# Patient Record
Sex: FEMALE | Race: WHITE | HISPANIC OR LATINO | Employment: UNEMPLOYED | URBAN - METROPOLITAN AREA
[De-identification: names, ages, dates, MRNs, and addresses within clinical notes are randomized per-mention and may not be internally consistent; named-entity substitution may affect disease eponyms.]

---

## 2018-04-20 LAB
ABSOL LYMPHOCYTES (HISTORICAL): 2.3 K/UL (ref 0.5–4)
ALBUMIN SERPL BCP-MCNC: 4.1 G/DL (ref 3–5.2)
ALP SERPL-CCNC: 81 U/L (ref 43–122)
ALT SERPL W P-5'-P-CCNC: 48 U/L (ref 9–52)
ANION GAP SERPL CALCULATED.3IONS-SCNC: 10 MMOL/L (ref 5–14)
APTT PPP: 26 SEC (ref 23–31)
AST SERPL W P-5'-P-CCNC: 26 U/L (ref 14–36)
BASOPHILS # BLD AUTO: 0.1 K/UL (ref 0–0.1)
BASOPHILS # BLD AUTO: 1 % (ref 0–1)
BILIRUB SERPL-MCNC: 0.3 MG/DL
BUN SERPL-MCNC: 21 MG/DL (ref 5–25)
CALCIUM SERPL-MCNC: 9.6 MG/DL (ref 8.4–10.2)
CHLORIDE SERPL-SCNC: 101 MEQ/L (ref 97–108)
CO2 SERPL-SCNC: 28 MMOL/L (ref 22–30)
CREATINE, SERUM (HISTORICAL): 0.61 MG/DL (ref 0.6–1.2)
D-DIMER QUANTITATIVE (HISTORICAL): 0.36 MG/L FEU'S
DEPRECATED RDW RBC AUTO: 14.1 %
EGFR (HISTORICAL): >60 ML/MIN/1.73 M2
EOSINOPHIL # BLD AUTO: 0.3 K/UL (ref 0–0.4)
EOSINOPHIL NFR BLD AUTO: 5 % (ref 0–6)
GLUCOSE SERPL-MCNC: 67 MG/DL (ref 70–99)
GLUCOSE SERPL-MCNC: 90 MG/DL (ref 70–99)
HCT VFR BLD AUTO: 43.4 % (ref 36–46)
HGB BLD-MCNC: 14 G/DL (ref 12–16)
LYMPHOCYTES NFR BLD AUTO: 36 % (ref 25–45)
MCH RBC QN AUTO: 29 PG (ref 26–34)
MCHC RBC AUTO-ENTMCNC: 32.2 % (ref 31–36)
MCV RBC AUTO: 90 FL (ref 80–100)
MONOCYTES # BLD AUTO: 0.5 K/UL (ref 0.2–0.9)
MONOCYTES NFR BLD AUTO: 7 % (ref 1–10)
NEUTROPHILS ABS COUNT (HISTORICAL): 3.4 K/UL (ref 1.8–7.8)
NEUTS SEG NFR BLD AUTO: 51 % (ref 45–65)
PLATELET # BLD AUTO: 164 K/MCL (ref 150–450)
POTASSIUM SERPL-SCNC: 3.5 MEQ/L (ref 3.6–5)
PT - I.N. RATIO (HISTORICAL): 1 RATIO(INR)
PT, PATIENT (HISTORICAL): 10 SEC (ref 9.2–11.1)
RBC # BLD AUTO: 4.83 M/MCL (ref 4–5.2)
SODIUM SERPL-SCNC: 139 MEQ/L (ref 137–147)
TOTAL PROTEIN (HISTORICAL): 6.8 G/DL (ref 5.9–8.4)
TROPONIN I SERPL-MCNC: <0.01 NG/ML (ref 0–0.03)
TSH SERPL DL<=0.05 MIU/L-ACNC: 2.7 UIU/ML (ref 0.47–4.68)
WBC # BLD AUTO: 6.6 K/MCL (ref 4.5–11)

## 2019-05-23 ENCOUNTER — OFFICE VISIT (OUTPATIENT)
Dept: FAMILY MEDICINE CLINIC | Facility: CLINIC | Age: 49
End: 2019-05-23

## 2019-05-23 VITALS
RESPIRATION RATE: 16 BRPM | HEIGHT: 57 IN | BODY MASS INDEX: 23.95 KG/M2 | SYSTOLIC BLOOD PRESSURE: 112 MMHG | DIASTOLIC BLOOD PRESSURE: 70 MMHG | OXYGEN SATURATION: 97 % | TEMPERATURE: 97.9 F | HEART RATE: 67 BPM | WEIGHT: 111 LBS

## 2019-05-23 DIAGNOSIS — R07.9 CHEST PAIN, UNSPECIFIED TYPE: ICD-10-CM

## 2019-05-23 DIAGNOSIS — H91.90 HEARING LOSS, UNSPECIFIED HEARING LOSS TYPE, UNSPECIFIED LATERALITY: ICD-10-CM

## 2019-05-23 DIAGNOSIS — Z13.220 SCREENING CHOLESTEROL LEVEL: ICD-10-CM

## 2019-05-23 DIAGNOSIS — R41.89 COGNITIVE DECLINE: Primary | ICD-10-CM

## 2019-05-23 DIAGNOSIS — R53.83 FATIGUE, UNSPECIFIED TYPE: ICD-10-CM

## 2019-05-23 DIAGNOSIS — M54.2 NECK PAIN: ICD-10-CM

## 2019-05-23 DIAGNOSIS — G89.29 CHRONIC LEFT-SIDED LOW BACK PAIN WITH LEFT-SIDED SCIATICA: ICD-10-CM

## 2019-05-23 DIAGNOSIS — M54.42 CHRONIC LEFT-SIDED LOW BACK PAIN WITH LEFT-SIDED SCIATICA: ICD-10-CM

## 2019-05-23 DIAGNOSIS — D50.9 IRON DEFICIENCY ANEMIA, UNSPECIFIED IRON DEFICIENCY ANEMIA TYPE: ICD-10-CM

## 2019-05-23 PROCEDURE — 99051 MED SERV EVE/WKEND/HOLIDAY: CPT | Performed by: PHYSICIAN ASSISTANT

## 2019-05-23 PROCEDURE — 99204 OFFICE O/P NEW MOD 45 MIN: CPT | Performed by: PHYSICIAN ASSISTANT

## 2019-05-23 RX ORDER — ACETAMINOPHEN 500 MG
TABLET ORAL
COMMUNITY
Start: 2016-12-21

## 2019-05-23 RX ORDER — CALCIUM/D3/MAG OX/COP/MANG/ZN 300 MG-20
1 TABLET ORAL 2 TIMES DAILY
Refills: 1 | COMMUNITY
Start: 2019-05-09 | End: 2020-09-23 | Stop reason: SDUPTHER

## 2019-05-23 RX ORDER — METRONIDAZOLE 500 MG/1
TABLET ORAL
Refills: 0 | COMMUNITY
Start: 2019-05-10 | End: 2020-08-27 | Stop reason: ALTCHOICE

## 2019-05-23 RX ORDER — CONJUGATED ESTROGENS 0.3 MG/1
TABLET, FILM COATED ORAL
Refills: 0 | COMMUNITY
Start: 2019-05-10 | End: 2021-09-20

## 2019-05-24 PROBLEM — H91.90 HEARING LOSS: Status: ACTIVE | Noted: 2019-05-24

## 2019-06-15 ENCOUNTER — APPOINTMENT (OUTPATIENT)
Dept: LAB | Age: 49
End: 2019-06-15
Payer: COMMERCIAL

## 2019-06-15 ENCOUNTER — APPOINTMENT (OUTPATIENT)
Dept: RADIOLOGY | Age: 49
End: 2019-06-15
Payer: COMMERCIAL

## 2019-06-15 DIAGNOSIS — Z13.220 SCREENING CHOLESTEROL LEVEL: ICD-10-CM

## 2019-06-15 DIAGNOSIS — G89.29 CHRONIC LEFT-SIDED LOW BACK PAIN WITH LEFT-SIDED SCIATICA: ICD-10-CM

## 2019-06-15 DIAGNOSIS — R07.9 CHEST PAIN, UNSPECIFIED TYPE: ICD-10-CM

## 2019-06-15 DIAGNOSIS — M54.42 CHRONIC LEFT-SIDED LOW BACK PAIN WITH LEFT-SIDED SCIATICA: ICD-10-CM

## 2019-06-15 DIAGNOSIS — M54.2 NECK PAIN: ICD-10-CM

## 2019-06-15 DIAGNOSIS — D50.9 IRON DEFICIENCY ANEMIA, UNSPECIFIED IRON DEFICIENCY ANEMIA TYPE: ICD-10-CM

## 2019-06-15 DIAGNOSIS — R53.83 FATIGUE, UNSPECIFIED TYPE: ICD-10-CM

## 2019-06-15 DIAGNOSIS — R41.89 COGNITIVE DECLINE: ICD-10-CM

## 2019-06-15 LAB
ALBUMIN SERPL BCP-MCNC: 3.9 G/DL (ref 3.5–5)
ALP SERPL-CCNC: 95 U/L (ref 46–116)
ALT SERPL W P-5'-P-CCNC: 47 U/L (ref 12–78)
ANION GAP SERPL CALCULATED.3IONS-SCNC: 1 MMOL/L (ref 4–13)
AST SERPL W P-5'-P-CCNC: 25 U/L (ref 5–45)
BASOPHILS # BLD AUTO: 0.04 THOUSANDS/ΜL (ref 0–0.1)
BASOPHILS NFR BLD AUTO: 1 % (ref 0–1)
BILIRUB SERPL-MCNC: 0.3 MG/DL (ref 0.2–1)
BILIRUB UR QL STRIP: NEGATIVE
BUN SERPL-MCNC: 14 MG/DL (ref 5–25)
CALCIUM SERPL-MCNC: 9.2 MG/DL (ref 8.3–10.1)
CHLORIDE SERPL-SCNC: 106 MMOL/L (ref 100–108)
CHOLEST SERPL-MCNC: 193 MG/DL (ref 50–200)
CLARITY UR: CLEAR
CO2 SERPL-SCNC: 30 MMOL/L (ref 21–32)
COLOR UR: YELLOW
CREAT SERPL-MCNC: 0.6 MG/DL (ref 0.6–1.3)
CRP SERPL QL: <3 MG/L
EOSINOPHIL # BLD AUTO: 0.27 THOUSAND/ΜL (ref 0–0.61)
EOSINOPHIL NFR BLD AUTO: 6 % (ref 0–6)
ERYTHROCYTE [DISTWIDTH] IN BLOOD BY AUTOMATED COUNT: 14.5 % (ref 11.6–15.1)
FERRITIN SERPL-MCNC: 73 NG/ML (ref 8–388)
FOLATE SERPL-MCNC: 19.4 NG/ML (ref 3.1–17.5)
GFR SERPL CREATININE-BSD FRML MDRD: 107 ML/MIN/1.73SQ M
GLUCOSE P FAST SERPL-MCNC: 69 MG/DL (ref 65–99)
GLUCOSE UR STRIP-MCNC: NEGATIVE MG/DL
HCT VFR BLD AUTO: 46.4 % (ref 34.8–46.1)
HDLC SERPL-MCNC: 80 MG/DL (ref 40–60)
HGB BLD-MCNC: 14.7 G/DL (ref 11.5–15.4)
HGB UR QL STRIP.AUTO: NEGATIVE
IMM GRANULOCYTES # BLD AUTO: 0.01 THOUSAND/UL (ref 0–0.2)
IMM GRANULOCYTES NFR BLD AUTO: 0 % (ref 0–2)
IRON SERPL-MCNC: 62 UG/DL (ref 50–170)
KETONES UR STRIP-MCNC: NEGATIVE MG/DL
LDLC SERPL CALC-MCNC: 105 MG/DL (ref 0–100)
LEUKOCYTE ESTERASE UR QL STRIP: NEGATIVE
LYMPHOCYTES # BLD AUTO: 1.72 THOUSANDS/ΜL (ref 0.6–4.47)
LYMPHOCYTES NFR BLD AUTO: 37 % (ref 14–44)
MCH RBC QN AUTO: 29.6 PG (ref 26.8–34.3)
MCHC RBC AUTO-ENTMCNC: 31.7 G/DL (ref 31.4–37.4)
MCV RBC AUTO: 94 FL (ref 82–98)
MONOCYTES # BLD AUTO: 0.34 THOUSAND/ΜL (ref 0.17–1.22)
MONOCYTES NFR BLD AUTO: 7 % (ref 4–12)
NEUTROPHILS # BLD AUTO: 2.23 THOUSANDS/ΜL (ref 1.85–7.62)
NEUTS SEG NFR BLD AUTO: 49 % (ref 43–75)
NITRITE UR QL STRIP: NEGATIVE
NONHDLC SERPL-MCNC: 113 MG/DL
NRBC BLD AUTO-RTO: 0 /100 WBCS
PH UR STRIP.AUTO: 6 [PH]
PLATELET # BLD AUTO: 176 THOUSANDS/UL (ref 149–390)
PMV BLD AUTO: 13.4 FL (ref 8.9–12.7)
POTASSIUM SERPL-SCNC: 4 MMOL/L (ref 3.5–5.3)
PROT SERPL-MCNC: 8.2 G/DL (ref 6.4–8.2)
PROT UR STRIP-MCNC: NEGATIVE MG/DL
RBC # BLD AUTO: 4.96 MILLION/UL (ref 3.81–5.12)
SODIUM SERPL-SCNC: 137 MMOL/L (ref 136–145)
SP GR UR STRIP.AUTO: 1.01 (ref 1–1.03)
TIBC SERPL-MCNC: 452 UG/DL (ref 250–450)
TRIGL SERPL-MCNC: 38 MG/DL
TSH SERPL DL<=0.05 MIU/L-ACNC: 1.97 UIU/ML (ref 0.36–3.74)
UROBILINOGEN UR QL STRIP.AUTO: 0.2 E.U./DL
VIT B12 SERPL-MCNC: 607 PG/ML (ref 100–900)
WBC # BLD AUTO: 4.61 THOUSAND/UL (ref 4.31–10.16)

## 2019-06-15 PROCEDURE — 86140 C-REACTIVE PROTEIN: CPT

## 2019-06-15 PROCEDURE — 72040 X-RAY EXAM NECK SPINE 2-3 VW: CPT

## 2019-06-15 PROCEDURE — 82607 VITAMIN B-12: CPT

## 2019-06-15 PROCEDURE — 80061 LIPID PANEL: CPT

## 2019-06-15 PROCEDURE — 81003 URINALYSIS AUTO W/O SCOPE: CPT | Performed by: PHYSICIAN ASSISTANT

## 2019-06-15 PROCEDURE — 80053 COMPREHEN METABOLIC PANEL: CPT

## 2019-06-15 PROCEDURE — 82746 ASSAY OF FOLIC ACID SERUM: CPT

## 2019-06-15 PROCEDURE — 85025 COMPLETE CBC W/AUTO DIFF WBC: CPT

## 2019-06-15 PROCEDURE — 71046 X-RAY EXAM CHEST 2 VIEWS: CPT

## 2019-06-15 PROCEDURE — 72110 X-RAY EXAM L-2 SPINE 4/>VWS: CPT

## 2019-06-15 PROCEDURE — 84425 ASSAY OF VITAMIN B-1: CPT

## 2019-06-15 PROCEDURE — 83540 ASSAY OF IRON: CPT

## 2019-06-15 PROCEDURE — 83550 IRON BINDING TEST: CPT

## 2019-06-15 PROCEDURE — 82728 ASSAY OF FERRITIN: CPT

## 2019-06-15 PROCEDURE — 86618 LYME DISEASE ANTIBODY: CPT

## 2019-06-15 PROCEDURE — 36415 COLL VENOUS BLD VENIPUNCTURE: CPT

## 2019-06-15 PROCEDURE — 84443 ASSAY THYROID STIM HORMONE: CPT

## 2019-06-15 PROCEDURE — 86592 SYPHILIS TEST NON-TREP QUAL: CPT

## 2019-06-16 LAB
B BURGDOR IGG SER IA-ACNC: 0.16
B BURGDOR IGM SER IA-ACNC: 0.32
RPR SER QL: NORMAL

## 2019-06-20 LAB — VIT B1 BLD-SCNC: 118 NMOL/L (ref 66.5–200)

## 2019-06-26 ENCOUNTER — TELEPHONE (OUTPATIENT)
Dept: FAMILY MEDICINE CLINIC | Facility: CLINIC | Age: 49
End: 2019-06-26

## 2019-06-26 DIAGNOSIS — M54.50 CHRONIC BILATERAL LOW BACK PAIN WITHOUT SCIATICA: ICD-10-CM

## 2019-06-26 DIAGNOSIS — G89.29 CHRONIC BILATERAL LOW BACK PAIN WITHOUT SCIATICA: ICD-10-CM

## 2019-06-26 DIAGNOSIS — M54.2 NECK PAIN: Primary | ICD-10-CM

## 2019-07-18 ENCOUNTER — TELEPHONE (OUTPATIENT)
Dept: FAMILY MEDICINE CLINIC | Facility: CLINIC | Age: 49
End: 2019-07-18

## 2019-07-18 NOTE — TELEPHONE ENCOUNTER
LM on spouses phone as main number is out of service, detailing appt 8/5/19 @ [facility], [address]   Phone: [phone]     Letter and orders mailed to address on file with appt details

## 2019-08-22 ENCOUNTER — APPOINTMENT (EMERGENCY)
Dept: RADIOLOGY | Facility: HOSPITAL | Age: 49
End: 2019-08-22
Payer: COMMERCIAL

## 2019-08-22 ENCOUNTER — HOSPITAL ENCOUNTER (EMERGENCY)
Facility: HOSPITAL | Age: 49
Discharge: HOME | End: 2019-08-23
Attending: EMERGENCY MEDICINE | Admitting: EMERGENCY MEDICINE
Payer: COMMERCIAL

## 2019-08-22 VITALS
BODY MASS INDEX: 23.52 KG/M2 | RESPIRATION RATE: 18 BRPM | TEMPERATURE: 95.5 F | DIASTOLIC BLOOD PRESSURE: 75 MMHG | SYSTOLIC BLOOD PRESSURE: 119 MMHG | HEART RATE: 80 BPM | WEIGHT: 108.69 LBS | OXYGEN SATURATION: 98 %

## 2019-08-22 DIAGNOSIS — J20.9 BRONCHITIS, ACUTE: Primary | ICD-10-CM

## 2019-08-22 PROCEDURE — 99283 EMERGENCY DEPT VISIT LOW MDM: CPT

## 2019-08-22 PROCEDURE — 99284 EMERGENCY DEPT VISIT MOD MDM: CPT | Performed by: EMERGENCY MEDICINE

## 2019-08-22 PROCEDURE — 71046 X-RAY EXAM CHEST 2 VIEWS: CPT

## 2019-08-22 RX ORDER — BENZONATATE 100 MG/1
100 CAPSULE ORAL ONCE
Status: COMPLETED | OUTPATIENT
Start: 2019-08-23 | End: 2019-08-22

## 2019-08-22 RX ADMIN — BENZONATATE 100 MG: 100 CAPSULE ORAL at 23:57

## 2019-08-22 RX ADMIN — DEXAMETHASONE SODIUM PHOSPHATE 10 MG: 10 INJECTION, SOLUTION INTRAMUSCULAR; INTRAVENOUS at 23:57

## 2019-08-23 RX ORDER — ALBUTEROL SULFATE 90 UG/1
2 AEROSOL, METERED RESPIRATORY (INHALATION) EVERY 4 HOURS PRN
Qty: 1 INHALER | Refills: 0 | Status: SHIPPED | OUTPATIENT
Start: 2019-08-23 | End: 2019-10-18 | Stop reason: SDUPTHER

## 2019-08-23 RX ORDER — DEXAMETHASONE 4 MG/1
12 TABLET ORAL ONCE
Qty: 3 TABLET | Refills: 0 | Status: SHIPPED | OUTPATIENT
Start: 2019-08-26 | End: 2019-08-26

## 2019-08-23 RX ORDER — BENZONATATE 100 MG/1
100 CAPSULE ORAL EVERY 8 HOURS
Qty: 21 CAPSULE | Refills: 0 | Status: SHIPPED | OUTPATIENT
Start: 2019-08-23 | End: 2019-09-24 | Stop reason: SDUPTHER

## 2019-08-23 NOTE — ED PROVIDER NOTES
History  Chief Complaint   Patient presents with    Cough     2 weeks, patient presents to ED with congested cough, generalized body aches; Diarrhea 3 days PTA, chest pain when coughing     Patient is a 25-year-old female, denies any past medical history, presented to the emergency room for concerns of 2 weeks of cough  Productive of occasional clue to clear sputum  States over the last few days she thinks she has developed subjective fevers  Now has diffuse body aches and nasal congestion  Denies any sick contacts or recent travel outside [facility]  Nothing makes her feel significant better or worse  Prior to Admission Medications   Prescriptions Last Dose Informant Patient Reported? Taking? Calcium Carbonate-Vit D-Min (CALTRATE 600+D PLUS MINIS) 300-800 MG-UNIT TABS 8/21/2019 at Unknown time  Yes Yes   Sig: Take 1 tablet by mouth 2 (two) times a day   PREMARIN 0 3 MG tablet Not Taking at Unknown time  Yes No   acetaminophen (TYLENOL) 500 mg tablet 8/22/2019 at Unknown time  Yes Yes   Sig: take 1 tablet by mouth every 6 hours if needed for pain   conjugated estrogens (PREMARIN) 0 3 mg tablet Not Taking at Unknown time  Yes No   Sig: Take 0 3 mg by mouth daily   metroNIDAZOLE (FLAGYL) 500 mg tablet Not Taking at Unknown time  Yes No   Sig: take 1 tablet by mouth twice a day for 7 days      Facility-Administered Medications: None       Past Medical History:   Diagnosis Date    Mass of breast, left     Mass of breast, right        Past Surgical History:   Procedure Laterality Date    LAPAROSCOPIC CHOLECYSTECTOMY      TUBAL LIGATION         Family History   Problem Relation Age of Onset    Hypertension Mother     Heart disease Father     Breast cancer Maternal Aunt     Pancreatic cancer Maternal Uncle     Stomach cancer Paternal Uncle      I have reviewed and agree with the history as documented      Social History     Tobacco Use    Smoking status: Never Smoker    Smokeless tobacco: Never Used   Substance Use Topics    Alcohol use: Yes     Frequency: Monthly or less    Drug use: Never        Review of Systems   Constitutional: Positive for fever  Negative for chills  HENT: Negative  Negative for rhinorrhea, sore throat, trouble swallowing and voice change  Eyes: Negative  Negative for pain and visual disturbance  Respiratory: Positive for cough  Negative for shortness of breath and wheezing  Cardiovascular: Negative  Negative for chest pain and palpitations  Gastrointestinal: Negative for abdominal pain, diarrhea, nausea and vomiting  Genitourinary: Negative  Negative for dysuria and frequency  Musculoskeletal: Negative  Negative for neck pain and neck stiffness  Skin: Negative  Negative for rash  Neurological: Negative  Negative for dizziness, speech difficulty, weakness, light-headedness and numbness  Physical Exam  Physical Exam   Constitutional: She is oriented to person, place, and time  She appears well-developed and well-nourished  No distress  HENT:   Head: Normocephalic and atraumatic  Mouth/Throat: Oropharynx is clear and moist    Eyes: Pupils are equal, round, and reactive to light  Conjunctivae and EOM are normal    Neck: Normal range of motion  Neck supple  No tracheal deviation present  Cardiovascular: Normal rate, regular rhythm and intact distal pulses  Pulmonary/Chest: Effort normal and breath sounds normal  No respiratory distress  She has no wheezes  She has no rales  Abdominal: Soft  Bowel sounds are normal  She exhibits no distension  There is no tenderness  There is no rebound and no guarding  Musculoskeletal: Normal range of motion  She exhibits no tenderness or deformity  Neurological: She is alert and oriented to person, place, and time  Skin: Skin is warm and dry  Capillary refill takes less than 2 seconds  No rash noted  Psychiatric: She has a normal mood and affect   Her behavior is normal    Nursing note and vitals reviewed  Vital Signs  ED Triage Vitals [08/22/19 2335]   Temperature Pulse Respirations Blood Pressure SpO2   (!) 95 5 °F (35 3 °C) 80 18 119/75 98 %      Temp Source Heart Rate Source Patient Position - Orthostatic VS BP Location FiO2 (%)   Tympanic Monitor Lying Left arm --      Pain Score       --           Vitals:    08/22/19 2335   BP: 119/75   Pulse: 80   Patient Position - Orthostatic VS: Lying         Visual Acuity      ED Medications  Medications   benzonatate (TESSALON PERLES) capsule 100 mg (100 mg Oral Given 8/22/19 2357)   dexamethasone 10 mg/mL oral liquid 10 mg 1 mL (10 mg Oral Given 8/22/19 2357)       Diagnostic Studies  Results Reviewed     None                 XR chest 2 views   ED Interpretation by Nicole Perez DO (08/23 0002)   No acute pna appreciated                 Procedures  Procedures       ED Course                               MDM  Number of Diagnoses or Management Options  Bronchitis, acute:   Diagnosis management comments: 48-year-old female presenting for cough and subjective fevers and chills  He febrile in the emergency department  No tachypnea, hypotension other concerning signs for overwhelming infection present  X-ray without acute pathology per my interpretation  Patient felt slightly better after Tessalon Perles  Given dose of steroids here in the emergency room  Exam consistent with acute bronchitis  Will give repeat steroids, Tessalon Perles and inhaler prescription  Advised to follow up with their primary care physician in the next few days to ensure symptoms are improving, strict return precautions were discussed         Amount and/or Complexity of Data Reviewed  Tests in the radiology section of CPT®: reviewed and ordered        Disposition  Final diagnoses:   Bronchitis, acute     Time reflects when diagnosis was documented in both MDM as applicable and the Disposition within this note     Time User Action Codes Description Comment    8/23/2019 12:03 PARKER Spaulding Add [J20 9] Bronchitis, acute       ED Disposition     ED Disposition Condition Date/Time Comment    Discharge Stable Fri Aug 23, 2019 12:02 AM Lázaro Sawyer discharge to home/self care  Follow-up Information    None         Patient's Medications   Discharge Prescriptions    ALBUTEROL (PROVENTIL HFA,VENTOLIN HFA) 90 MCG/ACT INHALER    Inhale 2 puffs every 4 (four) hours as needed for wheezing       Start Date: 8/23/2019 End Date: --       Order Dose: 2 puffs       Quantity: 1 Inhaler    Refills: 0    BENZONATATE (TESSALON PERLES) 100 MG CAPSULE    Take 1 capsule (100 mg total) by mouth every 8 (eight) hours       Start Date: 8/23/2019 End Date: --       Order Dose: 100 mg       Quantity: 21 capsule    Refills: 0    DEXAMETHASONE (DECADRON) 4 MG TABLET    Take 3 tablets (12 mg total) by mouth once for 1 dose       Start Date: 8/26/2019 End Date: 8/26/2019       Order Dose: 12 mg       Quantity: 3 tablet    Refills: 0     No discharge procedures on file      ED Provider  Electronically Signed by           Bradley Guzmán DO  08/23/19 4736

## 2019-08-26 ENCOUNTER — OFFICE VISIT (OUTPATIENT)
Dept: FAMILY MEDICINE CLINIC | Facility: CLINIC | Age: 49
End: 2019-08-26
Payer: COMMERCIAL

## 2019-08-26 VITALS
BODY MASS INDEX: 23.82 KG/M2 | DIASTOLIC BLOOD PRESSURE: 68 MMHG | HEART RATE: 86 BPM | WEIGHT: 110.4 LBS | SYSTOLIC BLOOD PRESSURE: 100 MMHG | HEIGHT: 57 IN | OXYGEN SATURATION: 98 % | TEMPERATURE: 97.2 F

## 2019-08-26 DIAGNOSIS — Z12.31 SCREENING MAMMOGRAM, ENCOUNTER FOR: ICD-10-CM

## 2019-08-26 DIAGNOSIS — F41.9 ANXIETY: ICD-10-CM

## 2019-08-26 DIAGNOSIS — R52 GENERALIZED BODY ACHES: ICD-10-CM

## 2019-08-26 DIAGNOSIS — F32.A DEPRESSION, UNSPECIFIED DEPRESSION TYPE: ICD-10-CM

## 2019-08-26 DIAGNOSIS — J06.9 UPPER RESPIRATORY TRACT INFECTION, UNSPECIFIED TYPE: Primary | ICD-10-CM

## 2019-08-26 DIAGNOSIS — J40 BRONCHITIS: ICD-10-CM

## 2019-08-26 PROCEDURE — 3008F BODY MASS INDEX DOCD: CPT | Performed by: NURSE PRACTITIONER

## 2019-08-26 PROCEDURE — 99214 OFFICE O/P EST MOD 30 MIN: CPT | Performed by: NURSE PRACTITIONER

## 2019-08-26 PROCEDURE — 1036F TOBACCO NON-USER: CPT | Performed by: NURSE PRACTITIONER

## 2019-08-26 RX ORDER — IBUPROFEN 600 MG/1
600 TABLET ORAL EVERY 6 HOURS PRN
Qty: 30 TABLET | Refills: 0 | Status: SHIPPED | OUTPATIENT
Start: 2019-08-26 | End: 2021-05-25 | Stop reason: ALTCHOICE

## 2019-08-26 RX ORDER — DEXTROMETHORPHAN HYDROBROMIDE AND PROMETHAZINE HYDROCHLORIDE 15; 6.25 MG/5ML; MG/5ML
5 SOLUTION ORAL 4 TIMES DAILY PRN
Qty: 118 ML | Refills: 0 | Status: SHIPPED | OUTPATIENT
Start: 2019-08-26 | End: 2020-04-07 | Stop reason: SDUPTHER

## 2019-08-26 RX ORDER — AZITHROMYCIN 250 MG/1
TABLET, FILM COATED ORAL
Qty: 6 TABLET | Refills: 0 | Status: SHIPPED | OUTPATIENT
Start: 2019-08-26 | End: 2019-08-30

## 2019-08-26 NOTE — LETTER
August 26, 2019     Patient: Brandan Brown   YOB: 1970   Date of Visit: 8/26/2019       To Whom it May Concern:    Brandan Brown is under my professional care  She was seen in my office on 8/26/2019  She may return to work on 8/28/2019  If you have any questions or concerns, please don't hesitate to call           Sincerely,          ROJELIO Moreno        CC: No Recipients

## 2019-08-26 NOTE — PROGRESS NOTES
Assessment/Plan:    Generalized body aches  I recommended warm moist air, hydration, warm facial packs, turning on hot shower and inhaling steam to help ease with breathing  Avoid extremely cool and dry air  Nasal saline may provide temporary relief of congestion by removing nasal crusts and dried secretions  Drinking 8 or more 8-oz glasses of water, juice, or non-caffeinated beverages daily may help to thin mucous secretions and replace fluid losses  Sipping hot water or warm drinks may be more soothing to the nasal passages than ice cold drinks  Should follow up if their symptoms do not improve, worsen within 72 hours, or recur  Upper respiratory tract infection  Starting on cough medication  Recommended plenty of rest and fluids  Strict handwashing and use of hand  to avoid getting anyone else sick  If s/s do not improve in 5 days to return to clinic  Recommending OTC cough drops as well and hot tea to soothe throat  To continue use of inhaler prn  To follow up if in 3 days s/s have not resolved  Diagnoses and all orders for this visit:    Upper respiratory tract infection, unspecified type    Bronchitis  -     Promethazine-DM (PHENERGAN-DM) 6 25-15 mg/5 mL oral syrup; Take 5 mL by mouth 4 (four) times a day as needed for cough  -     azithromycin (ZITHROMAX) 250 mg tablet; Take 2 tablets today then 1 tablet daily x 4 days    Generalized body aches  -     ibuprofen (MOTRIN) 600 mg tablet; Take 1 tablet (600 mg total) by mouth every 6 (six) hours as needed for mild pain or moderate pain  -     Vitamin D 25 hydroxy; Future    Screening mammogram, encounter for  -     Cancel: Mammo screening bilateral w cad; Future    Anxiety  -     Ambulatory referral to Psychiatry; Future    Depression, unspecified depression type  -     Ambulatory referral to Psychiatry; Future          Subjective:      Patient ID: Vernon Candelaria is a 51 y o  female  51year old female patient here to establish care   States she does not have a PCP and last time she was seen at clinic was 2 months ago  Had a physical exam and labs  PMHx: no significant medical history per patient  Cough   This is a new problem  The current episode started 1 to 4 weeks ago (2 weeks)  The problem has been unchanged  The problem occurs constantly  The cough is non-productive  Associated symptoms include a fever, headaches, nasal congestion, a sore throat and wheezing  Pertinent negatives include no chills, ear congestion, ear pain, heartburn, rash, rhinorrhea or shortness of breath  The symptoms are aggravated by lying down  She has tried prescription cough suppressant and steroid inhaler for the symptoms  The treatment provided mild relief  There is no history of asthma, bronchitis, COPD, emphysema, environmental allergies or pneumonia  Generalized Body Aches   The current episode started in the past 7 days  The problem occurs constantly  The problem has been gradually worsening since onset  The pain is moderate  The symptoms are aggravated by activity and movement  Associated symptoms include congestion, headaches, a sore throat, a fever, coughing, wheezing and muscle aches  Pertinent negatives include no ear pain, rhinorrhea, swollen glands, URI, shortness of breath, abdominal pain, constipation, diarrhea, nausea, vomiting or rash  Past treatments include one or more prescription drugs  The treatment provided mild relief  The fever has been present for 1 to 2 days  Her temperature was unmeasured prior to arrival  The cough's precipitants include activity  The cough is non-productive  The cough is relieved by one or more prescription drugs  The cough is worsened by activity and a supine position  There is chest congestion  The congestion interferes with sleep  The congestion does not interfere with eating or drinking  She has been experiencing a mild sore throat  Neither side is more painful than the other   The sore throat is characterized by pain only  There were no sick contacts  The following portions of the patient's history were reviewed and updated as appropriate: allergies, current medications, past family history, past medical history, past social history, past surgical history and problem list     Review of Systems   Constitutional: Positive for fever  Negative for appetite change and chills  HENT: Positive for congestion and sore throat  Negative for ear pain and rhinorrhea  Eyes: Negative  Respiratory: Positive for cough and wheezing  Negative for shortness of breath  Cardiovascular: Negative  Gastrointestinal: Negative  Negative for abdominal pain, constipation, diarrhea, heartburn, nausea and vomiting  Endocrine: Negative  Genitourinary: Negative  Musculoskeletal: Negative  Negative for arthralgias and gait problem  Skin: Negative  Negative for color change and rash  Allergic/Immunologic: Negative  Negative for environmental allergies  Neurological: Positive for headaches  Hematological: Negative  Psychiatric/Behavioral: Negative  Objective:      /68 (BP Location: Left arm, Patient Position: Sitting, Cuff Size: Adult)   Pulse 86   Temp (!) 97 2 °F (36 2 °C) (Temporal)   Ht 4' 9" (1 448 m)   Wt 50 1 kg (110 lb 6 4 oz)   SpO2 98%   BMI 23 89 kg/m²          Physical Exam   Constitutional: She is oriented to person, place, and time  She appears well-developed and well-nourished  No distress  HENT:   Head: Normocephalic and atraumatic  Right Ear: Hearing and external ear normal    Nose: Mucosal edema and rhinorrhea present  Right sinus exhibits no maxillary sinus tenderness and no frontal sinus tenderness  Left sinus exhibits no maxillary sinus tenderness and no frontal sinus tenderness  Mouth/Throat: Uvula is midline, oropharynx is clear and moist and mucous membranes are normal    Eyes: Pupils are equal, round, and reactive to light   Conjunctivae and EOM are normal    Neck: Normal range of motion  Neck supple  Cardiovascular: Normal rate, regular rhythm, normal heart sounds and intact distal pulses  Pulmonary/Chest: Effort normal and breath sounds normal  No respiratory distress  She has no wheezes  She has no rales  Abdominal: Soft  Bowel sounds are normal    Musculoskeletal: Normal range of motion  Lymphadenopathy:     She has no cervical adenopathy  Neurological: She is alert and oriented to person, place, and time  She has normal reflexes  Skin: Skin is warm and dry  Capillary refill takes less than 2 seconds  She is not diaphoretic  Psychiatric: She has a normal mood and affect  Thought content normal    Nursing note and vitals reviewed

## 2019-08-27 PROBLEM — J06.9 UPPER RESPIRATORY TRACT INFECTION: Status: ACTIVE | Noted: 2019-08-27

## 2019-08-27 NOTE — ASSESSMENT & PLAN NOTE
I recommended warm moist air, hydration, warm facial packs, turning on hot shower and inhaling steam to help ease with breathing  Avoid extremely cool and dry air  Nasal saline may provide temporary relief of congestion by removing nasal crusts and dried secretions  Drinking 8 or more 8-oz glasses of water, juice, or non-caffeinated beverages daily may help to thin mucous secretions and replace fluid losses  Sipping hot water or warm drinks may be more soothing to the nasal passages than ice cold drinks  Should follow up if their symptoms do not improve, worsen within 72 hours, or recur

## 2019-08-27 NOTE — ASSESSMENT & PLAN NOTE
Starting on cough medication  Recommended plenty of rest and fluids  Strict handwashing and use of hand  to avoid getting anyone else sick  If s/s do not improve in 5 days to return to clinic  Recommending OTC cough drops as well and hot tea to soothe throat  To continue use of inhaler prn  To follow up if in 3 days s/s have not resolved

## 2019-09-24 ENCOUNTER — OFFICE VISIT (OUTPATIENT)
Dept: FAMILY MEDICINE CLINIC | Facility: CLINIC | Age: 49
End: 2019-09-24
Payer: COMMERCIAL

## 2019-09-24 VITALS
TEMPERATURE: 98.1 F | SYSTOLIC BLOOD PRESSURE: 102 MMHG | DIASTOLIC BLOOD PRESSURE: 72 MMHG | RESPIRATION RATE: 16 BRPM | BODY MASS INDEX: 23.73 KG/M2 | HEART RATE: 75 BPM | HEIGHT: 57 IN | OXYGEN SATURATION: 98 % | WEIGHT: 110 LBS

## 2019-09-24 DIAGNOSIS — J20.9 BRONCHITIS, ACUTE: ICD-10-CM

## 2019-09-24 DIAGNOSIS — Z23 NEEDS FLU SHOT: ICD-10-CM

## 2019-09-24 DIAGNOSIS — R05.9 COUGH: Primary | ICD-10-CM

## 2019-09-24 PROCEDURE — 99214 OFFICE O/P EST MOD 30 MIN: CPT | Performed by: NURSE PRACTITIONER

## 2019-09-24 PROCEDURE — 3008F BODY MASS INDEX DOCD: CPT | Performed by: NURSE PRACTITIONER

## 2019-09-24 RX ORDER — FLUTICASONE PROPIONATE 50 MCG
2 SPRAY, SUSPENSION (ML) NASAL DAILY
Qty: 16 G | Refills: 0 | Status: SHIPPED | OUTPATIENT
Start: 2019-09-24 | End: 2019-10-18 | Stop reason: SDUPTHER

## 2019-09-24 RX ORDER — BENZONATATE 100 MG/1
100 CAPSULE ORAL EVERY 8 HOURS
Qty: 21 CAPSULE | Refills: 0 | Status: SHIPPED | OUTPATIENT
Start: 2019-09-24 | End: 2021-09-20

## 2019-09-24 NOTE — ASSESSMENT & PLAN NOTE
-Recurrent cough despite treatment with zpack and cough medication  I am ordering a workup as patient also reports night sweats that she attributes to menopause  Ordering Quant Gold, Sputum culture  To continue using albuterol inhaler and benzonatate  Prescription for flonase given  Conservative measures recommended  Will follow up with patient once done

## 2019-09-24 NOTE — PROGRESS NOTES
Assessment/Plan:    Cough  -Recurrent cough despite treatment with zpack and cough medication  I am ordering a workup as patient also reports night sweats that she attributes to menopause  Ordering Quant Gold, Sputum culture  To continue using albuterol inhaler and benzonatate  Prescription for flonase given  Conservative measures recommended  Will follow up with patient once done  Diagnoses and all orders for this visit:    Cough  -     Complete PFT with post bronchodilator; Future  -     Quantiferon TB Gold Plus; Future  -     Sputum culture and Gram stain; Future  -     fluticasone (FLONASE) 50 mcg/act nasal spray; 2 sprays into each nostril daily    Needs flu shot  -     influenza vaccine, [id number], quadrivalent, recombinant, PF, 0 5 mL, for patients 18 yr+ (FLUBLOK)    Bronchitis, acute  -     benzonatate (TESSALON PERLES) 100 mg capsule; Take 1 capsule (100 mg total) by mouth every 8 (eight) hours    Other orders  -     Cancel: POCT ECG          Subjective:      Patient ID: Mallory Trevino is a 51 y o  female  Cc  Per pt  Cough and congestion  Cough   This is a recurrent problem  The current episode started more than 1 month ago  The problem has been unchanged  The problem occurs every few hours  The cough is non-productive  Associated symptoms include nasal congestion, postnasal drip and wheezing  Pertinent negatives include no chest pain, ear congestion, ear pain, fever, headaches, rash or sore throat  The symptoms are aggravated by cold air  She has tried steroid inhaler for the symptoms  The treatment provided moderate relief  Her past medical history is significant for bronchitis  There is no history of asthma, COPD, emphysema, environmental allergies or pneumonia         The following portions of the patient's history were reviewed and updated as appropriate: allergies, current medications, past family history, past medical history, past social history, past surgical history and problem list     Review of Systems   Constitutional: Positive for diaphoresis  Negative for appetite change, fatigue and fever  HENT: Positive for congestion and postnasal drip  Negative for ear pain and sore throat  Eyes: Negative  Respiratory: Positive for cough and wheezing  Negative for chest tightness and stridor  Cardiovascular: Negative  Negative for chest pain and palpitations  Gastrointestinal: Negative  Endocrine: Negative  Genitourinary: Negative  Musculoskeletal: Negative  Skin: Negative  Negative for color change and rash  Allergic/Immunologic: Negative  Negative for environmental allergies  Neurological: Negative  Negative for dizziness and headaches  Hematological: Negative  Psychiatric/Behavioral: Negative  Objective:      /72 (BP Location: Left arm, Patient Position: Sitting, Cuff Size: Standard)   Pulse 75   Temp 98 1 °F (36 7 °C) (Oral)   Resp 16   Ht 4' 9" (1 448 m)   Wt 49 9 kg (110 lb)   SpO2 98%   BMI 23 80 kg/m²          Physical Exam   Constitutional: She is oriented to person, place, and time  She appears well-developed and well-nourished  No distress  HENT:   Head: Normocephalic and atraumatic  Right Ear: Hearing, tympanic membrane and ear canal normal    Left Ear: Hearing, tympanic membrane and ear canal normal    Nose: Mucosal edema and rhinorrhea present  Mouth/Throat: Uvula is midline and mucous membranes are normal  Oropharyngeal exudate and posterior oropharyngeal erythema present  Eyes: Pupils are equal, round, and reactive to light  EOM are normal    Neck: Normal range of motion  Neck supple  Cardiovascular: Normal rate, regular rhythm, normal heart sounds and intact distal pulses  Pulmonary/Chest: Effort normal and breath sounds normal  No respiratory distress  She has no wheezes  She has no rales  Abdominal: Soft  Bowel sounds are normal    Musculoskeletal: Normal range of motion     Lymphadenopathy:     She has no cervical adenopathy  Neurological: She is alert and oriented to person, place, and time  She has normal reflexes  Skin: Skin is warm and dry  Capillary refill takes less than 2 seconds  She is not diaphoretic  Psychiatric: She has a normal mood and affect  Her behavior is normal  Thought content normal    Nursing note and vitals reviewed

## 2019-10-18 DIAGNOSIS — J20.9 BRONCHITIS, ACUTE: ICD-10-CM

## 2019-10-18 DIAGNOSIS — R05.9 COUGH: ICD-10-CM

## 2019-10-21 RX ORDER — ALBUTEROL SULFATE 90 UG/1
2 AEROSOL, METERED RESPIRATORY (INHALATION) EVERY 4 HOURS PRN
Qty: 1 INHALER | Refills: 2 | Status: SHIPPED | OUTPATIENT
Start: 2019-10-21 | End: 2020-08-28 | Stop reason: SDUPTHER

## 2019-10-21 RX ORDER — FLUTICASONE PROPIONATE 50 MCG
2 SPRAY, SUSPENSION (ML) NASAL DAILY
Qty: 16 G | Refills: 2 | Status: SHIPPED | OUTPATIENT
Start: 2019-10-21 | End: 2021-05-25 | Stop reason: ALTCHOICE

## 2019-12-30 ENCOUNTER — TELEPHONE (OUTPATIENT)
Dept: FAMILY MEDICINE CLINIC | Facility: CLINIC | Age: 49
End: 2019-12-30

## 2020-02-14 ENCOUNTER — OFFICE VISIT (OUTPATIENT)
Dept: FAMILY MEDICINE CLINIC | Facility: CLINIC | Age: 50
End: 2020-02-14
Payer: COMMERCIAL

## 2020-02-14 VITALS
SYSTOLIC BLOOD PRESSURE: 110 MMHG | BODY MASS INDEX: 23.51 KG/M2 | WEIGHT: 109 LBS | HEART RATE: 77 BPM | OXYGEN SATURATION: 98 % | DIASTOLIC BLOOD PRESSURE: 80 MMHG | HEIGHT: 57 IN

## 2020-02-14 DIAGNOSIS — F32.89 OTHER DEPRESSION: ICD-10-CM

## 2020-02-14 DIAGNOSIS — Z12.11 SCREENING FOR COLON CANCER: ICD-10-CM

## 2020-02-14 DIAGNOSIS — H57.89 EYE REDNESS: Primary | ICD-10-CM

## 2020-02-14 PROCEDURE — 1036F TOBACCO NON-USER: CPT | Performed by: NURSE PRACTITIONER

## 2020-02-14 PROCEDURE — 99214 OFFICE O/P EST MOD 30 MIN: CPT | Performed by: NURSE PRACTITIONER

## 2020-02-14 NOTE — PROGRESS NOTES
Assessment/Plan:    Eye redness  -Patient eye redness noted on exam with pain and pressure  Pt blood pressure today controlled  Discussed with patient due to having pressure in her eye and pain I am referring to eye doctor for further evaluation of eye pressure  Pt under a lot of stress at home as well and feels this can contribute to pressure in her eye  With some blurry vision in eye without any discharge  Depression  -Today we are referring patient to psychiatry  Patient undergoing stress at home and feels a lot of tension due to teenage daughter getting pregnant  Referral given to psych  Diagnoses and all orders for this visit:    Eye redness  -     Ambulatory referral to Ophthalmology; Future    Other depression  -     Ambulatory referral to Psychiatry; Future    Screening for colon cancer  -     Occult Blood, Fecal Immunochemical; Future          Subjective:      Patient ID: Oscar Clark is a 47 y o  female  47 year female patient presents today for right eye with redness in right eye  Pt gets therapy due depression and stress due to her daughter having a teenage pregnancy  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 and is very stressed out per patient  Eye Problem    The right eye is affected  This is a new problem  The current episode started yesterday (2 days ago)  The problem occurs intermittently  The problem has been gradually worsening  There was no injury mechanism (denies any heavy lifting or straining; or high altitude)  Pain scale: eye pressure in right eye; unsure what number  There is no known exposure to pink eye  She does not wear contacts  Associated symptoms include blurred vision, eye redness, a foreign body sensation, itching and photophobia  Pertinent negatives include no eye discharge, fever, nausea, recent URI or vomiting  She has tried eye drops for the symptoms  The treatment provided no relief         The following portions of the patient's history were reviewed and updated as appropriate: allergies, current medications, past family history, past medical history, past social history, past surgical history and problem list     Review of Systems   Constitutional: Negative  Negative for chills and fever  HENT: Negative  Eyes: Positive for blurred vision, photophobia, pain, redness, itching and visual disturbance  Negative for discharge  Respiratory: Negative  Negative for cough and chest tightness  Cardiovascular: Negative  Negative for chest pain and palpitations  Gastrointestinal: Negative  Negative for nausea and vomiting  Endocrine: Negative  Genitourinary: Negative  Musculoskeletal: Negative  Skin: Negative  Allergic/Immunologic: Negative  Neurological: Negative  Hematological: Negative  Psychiatric/Behavioral: Negative for sleep disturbance and suicidal ideas  The patient is nervous/anxious  Objective:      /80 (BP Location: Left arm, Patient Position: Sitting, Cuff Size: Adult)   Pulse 77   Ht 4' 9" (1 448 m)   Wt 49 4 kg (109 lb)   SpO2 98%   BMI 23 59 kg/m²          Physical Exam   Constitutional: She is oriented to person, place, and time  She appears well-developed and well-nourished  No distress  HENT:   Head: Normocephalic and atraumatic  Right Ear: External ear normal    Left Ear: External ear normal    Eyes: Pupils are equal, round, and reactive to light  EOM and lids are normal  Right conjunctiva has a hemorrhage  Neck: Normal range of motion  Neck supple  Cardiovascular: Normal rate, regular rhythm and normal heart sounds  Pulmonary/Chest: Effort normal and breath sounds normal  No respiratory distress  She has no wheezes  She has no rales  Abdominal: Soft  Bowel sounds are normal    Musculoskeletal: Normal range of motion  Lymphadenopathy:     She has no cervical adenopathy  Neurological: She is alert and oriented to person, place, and time  She has normal reflexes  Skin: Skin is warm and dry  She is not diaphoretic  Psychiatric: Her speech is normal and behavior is normal  Thought content normal  She exhibits a depressed mood  Nursing note and vitals reviewed

## 2020-02-17 PROBLEM — H57.89 EYE REDNESS: Status: ACTIVE | Noted: 2020-02-17

## 2020-02-17 PROBLEM — F32.A DEPRESSION: Status: ACTIVE | Noted: 2020-02-17

## 2020-02-17 NOTE — ASSESSMENT & PLAN NOTE
-Today we are referring patient to psychiatry  Patient undergoing stress at home and feels a lot of tension due to teenage daughter getting pregnant  Referral given to psych

## 2020-02-17 NOTE — ASSESSMENT & PLAN NOTE
-Patient eye redness noted on exam with pain and pressure  Pt blood pressure today controlled  Discussed with patient due to having pressure in her eye and pain I am referring to eye doctor for further evaluation of eye pressure  Pt under a lot of stress at home as well and feels this can contribute to pressure in her eye  With some blurry vision in eye without any discharge

## 2020-03-17 ENCOUNTER — APPOINTMENT (OUTPATIENT)
Dept: RADIOLOGY | Age: 50
End: 2020-03-17
Payer: OTHER MISCELLANEOUS

## 2020-03-17 ENCOUNTER — TRANSCRIBE ORDERS (OUTPATIENT)
Dept: URGENT CARE | Age: 50
End: 2020-03-17

## 2020-03-17 ENCOUNTER — APPOINTMENT (OUTPATIENT)
Dept: URGENT CARE | Age: 50
End: 2020-03-17
Payer: OTHER MISCELLANEOUS

## 2020-03-17 DIAGNOSIS — S99.922A INJURY OF LEFT FOOT, INITIAL ENCOUNTER: Primary | ICD-10-CM

## 2020-03-17 DIAGNOSIS — S99.922A INJURY OF LEFT FOOT, INITIAL ENCOUNTER: ICD-10-CM

## 2020-03-17 PROCEDURE — 73630 X-RAY EXAM OF FOOT: CPT

## 2020-03-17 PROCEDURE — G0382 LEV 3 HOSP TYPE B ED VISIT: HCPCS | Performed by: PHYSICIAN ASSISTANT

## 2020-03-17 PROCEDURE — 99283 EMERGENCY DEPT VISIT LOW MDM: CPT | Performed by: PHYSICIAN ASSISTANT

## 2020-04-07 ENCOUNTER — TELEMEDICINE (OUTPATIENT)
Dept: FAMILY MEDICINE CLINIC | Facility: CLINIC | Age: 50
End: 2020-04-07
Payer: COMMERCIAL

## 2020-04-07 DIAGNOSIS — R19.7 DIARRHEA, UNSPECIFIED TYPE: ICD-10-CM

## 2020-04-07 DIAGNOSIS — Z20.828 EXPOSURE TO SARS-ASSOCIATED CORONAVIRUS: Primary | ICD-10-CM

## 2020-04-07 DIAGNOSIS — R05.9 COUGH: ICD-10-CM

## 2020-04-07 DIAGNOSIS — Z20.828 EXPOSURE TO SARS-ASSOCIATED CORONAVIRUS: ICD-10-CM

## 2020-04-07 PROCEDURE — 87635 SARS-COV-2 COVID-19 AMP PRB: CPT

## 2020-04-07 PROCEDURE — 99214 OFFICE O/P EST MOD 30 MIN: CPT | Performed by: NURSE PRACTITIONER

## 2020-04-07 RX ORDER — LOPERAMIDE HYDROCHLORIDE 2 MG/1
2 CAPSULE ORAL 4 TIMES DAILY PRN
Qty: 30 CAPSULE | Refills: 0 | Status: SHIPPED | OUTPATIENT
Start: 2020-04-07 | End: 2021-05-25 | Stop reason: ALTCHOICE

## 2020-04-07 RX ORDER — DEXTROMETHORPHAN HYDROBROMIDE AND PROMETHAZINE HYDROCHLORIDE 15; 6.25 MG/5ML; MG/5ML
5 SOLUTION ORAL 4 TIMES DAILY PRN
Qty: 118 ML | Refills: 0 | Status: SHIPPED | OUTPATIENT
Start: 2020-04-07 | End: 2021-05-25 | Stop reason: ALTCHOICE

## 2020-04-08 LAB — SARS-COV-2 RNA SPEC QL NAA+PROBE: NOT DETECTED

## 2020-04-21 ENCOUNTER — TELEPHONE (OUTPATIENT)
Dept: OBGYN CLINIC | Facility: CLINIC | Age: 50
End: 2020-04-21

## 2020-05-04 ENCOUNTER — TELEPHONE (OUTPATIENT)
Dept: OBGYN CLINIC | Facility: CLINIC | Age: 50
End: 2020-05-04

## 2020-05-21 ENCOUNTER — OFFICE VISIT (OUTPATIENT)
Dept: FAMILY MEDICINE CLINIC | Facility: CLINIC | Age: 50
End: 2020-05-21
Payer: COMMERCIAL

## 2020-05-21 ENCOUNTER — APPOINTMENT (OUTPATIENT)
Dept: LAB | Facility: HOSPITAL | Age: 50
End: 2020-05-21
Payer: COMMERCIAL

## 2020-05-21 VITALS
WEIGHT: 109.6 LBS | DIASTOLIC BLOOD PRESSURE: 80 MMHG | SYSTOLIC BLOOD PRESSURE: 110 MMHG | HEIGHT: 57 IN | BODY MASS INDEX: 23.64 KG/M2 | TEMPERATURE: 98 F | OXYGEN SATURATION: 97 % | HEART RATE: 96 BPM

## 2020-05-21 DIAGNOSIS — Z12.11 SCREENING FOR COLON CANCER: ICD-10-CM

## 2020-05-21 DIAGNOSIS — F41.9 ANXIETY: Primary | ICD-10-CM

## 2020-05-21 DIAGNOSIS — Z11.4 SCREENING FOR HIV (HUMAN IMMUNODEFICIENCY VIRUS): ICD-10-CM

## 2020-05-21 DIAGNOSIS — R73.9 HYPERGLYCEMIA: ICD-10-CM

## 2020-05-21 DIAGNOSIS — R06.02 SHORTNESS OF BREATH: ICD-10-CM

## 2020-05-21 LAB — HEMOCCULT STL QL IA: NEGATIVE

## 2020-05-21 PROCEDURE — 1036F TOBACCO NON-USER: CPT | Performed by: NURSE PRACTITIONER

## 2020-05-21 PROCEDURE — 99214 OFFICE O/P EST MOD 30 MIN: CPT | Performed by: NURSE PRACTITIONER

## 2020-05-21 PROCEDURE — G0328 FECAL BLOOD SCRN IMMUNOASSAY: HCPCS

## 2020-05-21 PROCEDURE — 3008F BODY MASS INDEX DOCD: CPT | Performed by: NURSE PRACTITIONER

## 2020-05-21 RX ORDER — HYDROXYZINE HYDROCHLORIDE 25 MG/1
25 TABLET, FILM COATED ORAL
Qty: 30 TABLET | Refills: 0 | Status: SHIPPED | OUTPATIENT
Start: 2020-05-21 | End: 2021-05-25 | Stop reason: ALTCHOICE

## 2020-07-07 ENCOUNTER — TELEPHONE (OUTPATIENT)
Dept: PSYCHIATRY | Facility: CLINIC | Age: 50
End: 2020-07-07

## 2020-08-17 ENCOUNTER — TRANSCRIBE ORDERS (OUTPATIENT)
Dept: LAB | Facility: HOSPITAL | Age: 50
End: 2020-08-17

## 2020-08-17 ENCOUNTER — LAB (OUTPATIENT)
Dept: LAB | Facility: HOSPITAL | Age: 50
End: 2020-08-17
Attending: PODIATRIST
Payer: COMMERCIAL

## 2020-08-17 DIAGNOSIS — R52 GENERALIZED BODY ACHES: ICD-10-CM

## 2020-08-17 DIAGNOSIS — R06.02 SHORTNESS OF BREATH: ICD-10-CM

## 2020-08-17 DIAGNOSIS — Z11.4 SCREENING FOR HIV (HUMAN IMMUNODEFICIENCY VIRUS): ICD-10-CM

## 2020-08-17 DIAGNOSIS — Z01.818 OTHER SPECIFIED PRE-OPERATIVE EXAMINATION: Primary | ICD-10-CM

## 2020-08-17 DIAGNOSIS — R05.9 COUGH: ICD-10-CM

## 2020-08-17 DIAGNOSIS — R73.9 HYPERGLYCEMIA: ICD-10-CM

## 2020-08-17 DIAGNOSIS — Z01.818 OTHER SPECIFIED PRE-OPERATIVE EXAMINATION: ICD-10-CM

## 2020-08-17 LAB
25(OH)D3 SERPL-MCNC: 33.6 NG/ML (ref 30–100)
ALBUMIN SERPL BCP-MCNC: 4.4 G/DL (ref 3–5.2)
ALP SERPL-CCNC: 90 U/L (ref 43–122)
ALT SERPL W P-5'-P-CCNC: 30 U/L (ref 9–52)
ANION GAP SERPL CALCULATED.3IONS-SCNC: 6 MMOL/L (ref 5–14)
APTT PPP: 31 SECONDS (ref 23–37)
AST SERPL W P-5'-P-CCNC: 32 U/L (ref 14–36)
BASOPHILS # BLD AUTO: 0 THOUSANDS/ΜL (ref 0–0.1)
BASOPHILS NFR BLD AUTO: 1 % (ref 0–1)
BILIRUB SERPL-MCNC: 0.6 MG/DL
BUN SERPL-MCNC: 16 MG/DL (ref 5–25)
CALCIUM SERPL-MCNC: 9.9 MG/DL (ref 8.4–10.2)
CHLORIDE SERPL-SCNC: 102 MMOL/L (ref 97–108)
CO2 SERPL-SCNC: 31 MMOL/L (ref 22–30)
CREAT SERPL-MCNC: 0.6 MG/DL (ref 0.6–1.2)
EOSINOPHIL # BLD AUTO: 0.2 THOUSAND/ΜL (ref 0–0.4)
EOSINOPHIL NFR BLD AUTO: 7 % (ref 0–6)
ERYTHROCYTE [DISTWIDTH] IN BLOOD BY AUTOMATED COUNT: 14.4 %
GFR SERPL CREATININE-BSD FRML MDRD: 107 ML/MIN/1.73SQ M
GLUCOSE P FAST SERPL-MCNC: 76 MG/DL (ref 70–99)
HCT VFR BLD AUTO: 43.2 % (ref 36–46)
HGB BLD-MCNC: 14.3 G/DL (ref 12–16)
INR PPP: 1 (ref 0.84–1.19)
LYMPHOCYTES # BLD AUTO: 1.3 THOUSANDS/ΜL (ref 0.5–4)
LYMPHOCYTES NFR BLD AUTO: 36 % (ref 25–45)
MCH RBC QN AUTO: 29.7 PG (ref 26–34)
MCHC RBC AUTO-ENTMCNC: 33.2 G/DL (ref 31–36)
MCV RBC AUTO: 90 FL (ref 80–100)
MONOCYTES # BLD AUTO: 0.3 THOUSAND/ΜL (ref 0.2–0.9)
MONOCYTES NFR BLD AUTO: 8 % (ref 1–10)
NEUTROPHILS # BLD AUTO: 1.7 THOUSANDS/ΜL (ref 1.8–7.8)
NEUTS SEG NFR BLD AUTO: 48 % (ref 45–65)
PLATELET # BLD AUTO: 184 THOUSANDS/UL (ref 150–450)
PMV BLD AUTO: 10.5 FL (ref 8.9–12.7)
POTASSIUM SERPL-SCNC: 4.1 MMOL/L (ref 3.6–5)
PROT SERPL-MCNC: 8 G/DL (ref 5.9–8.4)
PROTHROMBIN TIME: 13.3 SECONDS (ref 11.6–14.5)
RBC # BLD AUTO: 4.82 MILLION/UL (ref 4–5.2)
SODIUM SERPL-SCNC: 139 MMOL/L (ref 137–147)
WBC # BLD AUTO: 3.6 THOUSAND/UL (ref 4.5–11)

## 2020-08-17 PROCEDURE — 86480 TB TEST CELL IMMUN MEASURE: CPT

## 2020-08-17 PROCEDURE — 82306 VITAMIN D 25 HYDROXY: CPT

## 2020-08-17 PROCEDURE — 87389 HIV-1 AG W/HIV-1&-2 AB AG IA: CPT

## 2020-08-17 PROCEDURE — 80053 COMPREHEN METABOLIC PANEL: CPT

## 2020-08-17 PROCEDURE — 85610 PROTHROMBIN TIME: CPT

## 2020-08-17 PROCEDURE — 93005 ELECTROCARDIOGRAM TRACING: CPT

## 2020-08-17 PROCEDURE — 85730 THROMBOPLASTIN TIME PARTIAL: CPT

## 2020-08-17 PROCEDURE — 85025 COMPLETE CBC W/AUTO DIFF WBC: CPT

## 2020-08-17 PROCEDURE — 36415 COLL VENOUS BLD VENIPUNCTURE: CPT

## 2020-08-18 LAB
ATRIAL RATE: 62 BPM
HIV 1+2 AB+HIV1 P24 AG SERPL QL IA: NORMAL
P AXIS: 73 DEGREES
PR INTERVAL: 144 MS
QRS AXIS: 81 DEGREES
QRSD INTERVAL: 96 MS
QT INTERVAL: 386 MS
QTC INTERVAL: 391 MS
T WAVE AXIS: 60 DEGREES
VENTRICULAR RATE: 62 BPM

## 2020-08-18 PROCEDURE — 93010 ELECTROCARDIOGRAM REPORT: CPT

## 2020-08-19 LAB
GAMMA INTERFERON BACKGROUND BLD IA-ACNC: 0.09 IU/ML
M TB IFN-G BLD-IMP: NEGATIVE
M TB IFN-G CD4+ BCKGRND COR BLD-ACNC: 0.04 IU/ML
M TB IFN-G CD4+ BCKGRND COR BLD-ACNC: 0.08 IU/ML
MITOGEN IGNF BCKGRD COR BLD-ACNC: >10 IU/ML

## 2020-08-26 RX ORDER — LIDOCAINE 50 MG/G
OINTMENT TOPICAL
COMMUNITY
Start: 2020-07-29

## 2020-08-27 NOTE — PRE-PROCEDURE INSTRUCTIONS
Pre-Surgery Instructions:   Medication Instructions    acetaminophen (TYLENOL) 500 mg tablet Instructed patient per Anesthesia Guidelines   albuterol (PROVENTIL HFA,VENTOLIN HFA) 90 mcg/act inhaler Instructed patient per Anesthesia Guidelines   Calcium Carbonate-Vit D-Min (CALTRATE 600+D PLUS MINIS) 300-800 MG-UNIT TABS Instructed patient per Anesthesia Guidelines   conjugated estrogens (PREMARIN) 0 3 mg tablet Instructed patient per Anesthesia Guidelines   Diclofenac Sodium 1 5 % SOLN Instructed patient per Anesthesia Guidelines   hydrOXYzine HCL (ATARAX) 25 mg tablet Instructed patient per Anesthesia Guidelines   lidocaine (XYLOCAINE) 5 % ointment Instructed patient per Anesthesia Guidelines  Preop phone assessment completed with verbal understanding   Instructed on NPO MN and clear liquids preop, Shower X2 with CHG, am meds and to bring Inhaler, insurance card and photo ID,  for disch home, and preop phone call from [facility] pm of 8/31/2020

## 2020-08-28 ENCOUNTER — CONSULT (OUTPATIENT)
Dept: FAMILY MEDICINE CLINIC | Facility: CLINIC | Age: 50
End: 2020-08-28
Payer: COMMERCIAL

## 2020-08-28 VITALS
HEIGHT: 57 IN | WEIGHT: 112.6 LBS | BODY MASS INDEX: 24.29 KG/M2 | DIASTOLIC BLOOD PRESSURE: 80 MMHG | HEART RATE: 84 BPM | TEMPERATURE: 97.6 F | SYSTOLIC BLOOD PRESSURE: 120 MMHG | OXYGEN SATURATION: 100 %

## 2020-08-28 DIAGNOSIS — J20.9 BRONCHITIS, ACUTE: ICD-10-CM

## 2020-08-28 DIAGNOSIS — Z01.818 PREOPERATIVE CLEARANCE: Primary | ICD-10-CM

## 2020-08-28 PROCEDURE — 3008F BODY MASS INDEX DOCD: CPT | Performed by: NURSE PRACTITIONER

## 2020-08-28 PROCEDURE — 1036F TOBACCO NON-USER: CPT | Performed by: NURSE PRACTITIONER

## 2020-08-28 PROCEDURE — 99214 OFFICE O/P EST MOD 30 MIN: CPT | Performed by: NURSE PRACTITIONER

## 2020-08-28 RX ORDER — ALBUTEROL SULFATE 90 UG/1
2 AEROSOL, METERED RESPIRATORY (INHALATION) EVERY 4 HOURS PRN
Qty: 1 INHALER | Refills: 2 | Status: SHIPPED | OUTPATIENT
Start: 2020-08-28 | End: 2020-09-22 | Stop reason: SDUPTHER

## 2020-08-28 RX ORDER — METRONIDAZOLE 500 MG/1
500 TABLET ORAL 2 TIMES DAILY
COMMUNITY
Start: 2020-08-27 | End: 2020-09-03

## 2020-08-28 RX ORDER — FLUCONAZOLE 150 MG/1
150 TABLET ORAL
COMMUNITY
Start: 2020-08-27 | End: 2020-09-04

## 2020-08-28 NOTE — PROGRESS NOTES
Assessment/Plan:    Preoperative clearance  Presently clinically stable for scheduled bunion surgery  Avoidance of; Aspirin, Ibuprofen, Naproxen, and other NSAIDS 5-7 days prior to surgery  To call with any changes in present status  EKG done with NSR, incomplete bundle branch block borderline EKG  CMP and CBC normal        Diagnoses and all orders for this visit:    Preoperative clearance    Bronchitis, acute  -     albuterol (PROVENTIL HFA,VENTOLIN HFA) 90 mcg/act inhaler; Inhale 2 puffs every 4 (four) hours as needed for wheezing          Subjective:      Patient ID: Daren Del Cid is a 47 y o  female  47year old female patient here for preop clearance for bunionectomy on 9/1/20      The following portions of the patient's history were reviewed and updated as appropriate: allergies, current medications, past family history, past medical history, past social history, past surgical history and problem list     Review of Systems   Constitutional: Negative  Negative for appetite change, fatigue and fever  HENT: Negative  Eyes: Negative  Respiratory: Negative  Negative for cough, chest tightness, shortness of breath and wheezing  Cardiovascular: Negative  Negative for chest pain and palpitations  Gastrointestinal: Negative  Endocrine: Negative  Genitourinary: Negative  Musculoskeletal: Positive for arthralgias (right bunion) and myalgias  Skin: Negative  Allergic/Immunologic: Negative  Neurological: Negative  Negative for dizziness and headaches  Hematological: Negative  Psychiatric/Behavioral: Negative  Objective:      /80 (BP Location: Left arm, Patient Position: Sitting, Cuff Size: Adult)   Pulse 84   Temp 97 6 °F (36 4 °C) (Oral)   Ht 4' 9" (1 448 m)   Wt 51 1 kg (112 lb 9 6 oz)   SpO2 100%   BMI 24 37 kg/m²          Physical Exam  Vitals signs and nursing note reviewed  Constitutional:       General: She is not in acute distress       Appearance: Normal appearance  She is normal weight  She is not ill-appearing, toxic-appearing or diaphoretic  HENT:      Head: Normocephalic and atraumatic  Right Ear: Tympanic membrane, ear canal and external ear normal       Left Ear: Tympanic membrane, ear canal and external ear normal       Nose: Nose normal  No congestion or rhinorrhea  Mouth/Throat:      Mouth: Mucous membranes are moist       Pharynx: Oropharynx is clear  No oropharyngeal exudate or posterior oropharyngeal erythema  Eyes:      Extraocular Movements: Extraocular movements intact  Conjunctiva/sclera: Conjunctivae normal       Pupils: Pupils are equal, round, and reactive to light  Neck:      Musculoskeletal: Normal range of motion and neck supple  Cardiovascular:      Rate and Rhythm: Normal rate and regular rhythm  Pulses: Normal pulses  Heart sounds: Normal heart sounds  Pulmonary:      Effort: Pulmonary effort is normal  No respiratory distress  Breath sounds: Normal breath sounds  Abdominal:      General: Bowel sounds are normal       Palpations: Abdomen is soft  Musculoskeletal: Normal range of motion  General: Tenderness and deformity (right bunion) present  Skin:     General: Skin is warm and dry  Capillary Refill: Capillary refill takes less than 2 seconds  Neurological:      General: No focal deficit present  Mental Status: She is alert and oriented to person, place, and time  Psychiatric:         Mood and Affect: Mood normal          Behavior: Behavior normal          Thought Content:  Thought content normal          Judgment: Judgment normal

## 2020-08-28 NOTE — H&P (VIEW-ONLY)
Assessment/Plan:    Preoperative clearance  Presently clinically stable for scheduled bunion surgery  Avoidance of; Aspirin, Ibuprofen, Naproxen, and other NSAIDS 5-7 days prior to surgery  To call with any changes in present status  EKG done with NSR, incomplete bundle branch block borderline EKG  CMP and CBC normal        Diagnoses and all orders for this visit:    Preoperative clearance    Bronchitis, acute  -     albuterol (PROVENTIL HFA,VENTOLIN HFA) 90 mcg/act inhaler; Inhale 2 puffs every 4 (four) hours as needed for wheezing          Subjective:      Patient ID: Lillie Guzmán is a 47 y o  female  47year old female patient here for preop clearance for bunionectomy on 9/1/20      The following portions of the patient's history were reviewed and updated as appropriate: allergies, current medications, past family history, past medical history, past social history, past surgical history and problem list     Review of Systems   Constitutional: Negative  Negative for appetite change, fatigue and fever  HENT: Negative  Eyes: Negative  Respiratory: Negative  Negative for cough, chest tightness, shortness of breath and wheezing  Cardiovascular: Negative  Negative for chest pain and palpitations  Gastrointestinal: Negative  Endocrine: Negative  Genitourinary: Negative  Musculoskeletal: Positive for arthralgias (right bunion) and myalgias  Skin: Negative  Allergic/Immunologic: Negative  Neurological: Negative  Negative for dizziness and headaches  Hematological: Negative  Psychiatric/Behavioral: Negative  Objective:      /80 (BP Location: Left arm, Patient Position: Sitting, Cuff Size: Adult)   Pulse 84   Temp 97 6 °F (36 4 °C) (Oral)   Ht 4' 9" (1 448 m)   Wt 51 1 kg (112 lb 9 6 oz)   SpO2 100%   BMI 24 37 kg/m²          Physical Exam  Vitals signs and nursing note reviewed  Constitutional:       General: She is not in acute distress       Appearance: Normal appearance  She is normal weight  She is not ill-appearing, toxic-appearing or diaphoretic  HENT:      Head: Normocephalic and atraumatic  Right Ear: Tympanic membrane, ear canal and external ear normal       Left Ear: Tympanic membrane, ear canal and external ear normal       Nose: Nose normal  No congestion or rhinorrhea  Mouth/Throat:      Mouth: Mucous membranes are moist       Pharynx: Oropharynx is clear  No oropharyngeal exudate or posterior oropharyngeal erythema  Eyes:      Extraocular Movements: Extraocular movements intact  Conjunctiva/sclera: Conjunctivae normal       Pupils: Pupils are equal, round, and reactive to light  Neck:      Musculoskeletal: Normal range of motion and neck supple  Cardiovascular:      Rate and Rhythm: Normal rate and regular rhythm  Pulses: Normal pulses  Heart sounds: Normal heart sounds  Pulmonary:      Effort: Pulmonary effort is normal  No respiratory distress  Breath sounds: Normal breath sounds  Abdominal:      General: Bowel sounds are normal       Palpations: Abdomen is soft  Musculoskeletal: Normal range of motion  General: Tenderness and deformity (right bunion) present  Skin:     General: Skin is warm and dry  Capillary Refill: Capillary refill takes less than 2 seconds  Neurological:      General: No focal deficit present  Mental Status: She is alert and oriented to person, place, and time  Psychiatric:         Mood and Affect: Mood normal          Behavior: Behavior normal          Thought Content:  Thought content normal          Judgment: Judgment normal

## 2020-08-28 NOTE — ASSESSMENT & PLAN NOTE
Presently clinically stable for scheduled bunion surgery  Avoidance of; Aspirin, Ibuprofen, Naproxen, and other NSAIDS 5-7 days prior to surgery  To call with any changes in present status  EKG done with NSR, incomplete bundle branch block borderline EKG   CMP and CBC normal

## 2020-08-31 ENCOUNTER — ANESTHESIA EVENT (OUTPATIENT)
Dept: PERIOP | Facility: HOSPITAL | Age: 50
End: 2020-08-31
Payer: COMMERCIAL

## 2020-08-31 NOTE — ANESTHESIA PREPROCEDURE EVALUATION
Procedure:  BUNIONECTOMY OLIVIA (Right Foot)    Relevant Problems   NEURO/PSYCH   (+) Anxiety   (+) Anxiety disorder   (+) Depression   (+) Fibromyalgia   (+) Panic attack      PULMONARY   (+) Shortness of breath   (+) Upper respiratory tract infection        Physical Exam    Airway    Mallampati score: I  TM Distance: >3 FB  Neck ROM: full     Dental       Cardiovascular  Cardiovascular exam normal    Pulmonary  Pulmonary exam normal     Other Findings  Keep the right arm adducted and by pt side  Abduction is painful no acute inflammation or redness noted       Anesthesia Plan  ASA Score- 2     Anesthesia Type- IV sedation with anesthesia with ASA Monitors  Additional Monitors:   Airway Plan:           Plan Factors-Exercise tolerance (METS): >4 METS  Chart reviewed  Patient is not a current smoker  Patient not instructed to abstain from smoking on day of procedure  Patient did not smoke on day of surgery  Induction-     Postoperative Plan-     Informed Consent- Anesthetic plan and risks discussed with patient  I personally reviewed this patient with the CRNA  Discussed and agreed on the Anesthesia Plan with the CRNA  Chau Garrison           No results found for: HGBA1C    Lab Results   Component Value Date     04/20/2018    K 4 1 08/17/2020     08/17/2020    CO2 31 (H) 08/17/2020    ANIONGAP 10 04/20/2018    BUN 16 08/17/2020    CREATININE 0 60 08/17/2020    GLUCOSE 67 (L) 04/20/2018    GLUF 76 08/17/2020    CALCIUM 9 9 08/17/2020    AST 32 08/17/2020    ALT 30 08/17/2020    ALKPHOS 90 08/17/2020    PROT 6 8 04/20/2018    BILITOT 0 3 04/20/2018    EGFR 107 08/17/2020       Lab Results   Component Value Date    WBC 3 60 (L) 08/17/2020    HGB 14 3 08/17/2020    HCT 43 2 08/17/2020    MCV 90 08/17/2020     08/17/2020    Normal sinus rhythm  Incomplete right bundle branch block  Borderline ECG  No previous ECGs available  Confirmed by Omid Stevens ([id number]) on 8/18/2020 10:53:09 AM

## 2020-09-01 ENCOUNTER — HOSPITAL ENCOUNTER (OUTPATIENT)
Facility: HOSPITAL | Age: 50
Setting detail: OUTPATIENT SURGERY
Discharge: HOME | End: 2020-09-01
Attending: PODIATRIST | Admitting: PODIATRIST
Payer: COMMERCIAL

## 2020-09-01 ENCOUNTER — ANESTHESIA (OUTPATIENT)
Dept: PERIOP | Facility: HOSPITAL | Age: 50
End: 2020-09-01
Payer: COMMERCIAL

## 2020-09-01 ENCOUNTER — APPOINTMENT (OUTPATIENT)
Dept: RADIOLOGY | Facility: HOSPITAL | Age: 50
End: 2020-09-01
Payer: COMMERCIAL

## 2020-09-01 VITALS
TEMPERATURE: 96.8 F | OXYGEN SATURATION: 99 % | HEART RATE: 63 BPM | DIASTOLIC BLOOD PRESSURE: 64 MMHG | HEIGHT: 57 IN | BODY MASS INDEX: 24.16 KG/M2 | RESPIRATION RATE: 20 BRPM | WEIGHT: 112 LBS | SYSTOLIC BLOOD PRESSURE: 122 MMHG

## 2020-09-01 DIAGNOSIS — Z98.890 STATUS POST SURGERY: Primary | ICD-10-CM

## 2020-09-01 PROCEDURE — C1713 ANCHOR/SCREW BN/BN,TIS/BN: HCPCS | Performed by: PODIATRIST

## 2020-09-01 PROCEDURE — 73630 X-RAY EXAM OF FOOT: CPT

## 2020-09-01 DEVICE — CANNULATED SCREW
Type: IMPLANTABLE DEVICE | Site: FOOT | Status: FUNCTIONAL
Brand: ASNIS

## 2020-09-01 RX ORDER — OXYCODONE HYDROCHLORIDE AND ACETAMINOPHEN 5; 325 MG/1; MG/1
1 TABLET ORAL EVERY 4 HOURS PRN
Status: DISCONTINUED | OUTPATIENT
Start: 2020-09-01 | End: 2020-09-01 | Stop reason: HOSPADM

## 2020-09-01 RX ORDER — ONDANSETRON 2 MG/ML
4 INJECTION INTRAMUSCULAR; INTRAVENOUS ONCE AS NEEDED
Status: DISCONTINUED | OUTPATIENT
Start: 2020-09-01 | End: 2020-09-01 | Stop reason: HOSPADM

## 2020-09-01 RX ORDER — PROMETHAZINE HYDROCHLORIDE 25 MG/ML
12.5 INJECTION, SOLUTION INTRAMUSCULAR; INTRAVENOUS ONCE AS NEEDED
Status: DISCONTINUED | OUTPATIENT
Start: 2020-09-01 | End: 2020-09-01 | Stop reason: HOSPADM

## 2020-09-01 RX ORDER — CEFAZOLIN SODIUM 2 G/50ML
2000 SOLUTION INTRAVENOUS ONCE
Status: COMPLETED | OUTPATIENT
Start: 2020-09-01 | End: 2020-09-01

## 2020-09-01 RX ORDER — OXYCODONE HYDROCHLORIDE AND ACETAMINOPHEN 5; 325 MG/1; MG/1
1 TABLET ORAL EVERY 4 HOURS PRN
Qty: 28 TABLET | Refills: 0 | Status: SHIPPED | OUTPATIENT
Start: 2020-09-01 | End: 2020-09-11

## 2020-09-01 RX ORDER — SODIUM CHLORIDE, SODIUM LACTATE, POTASSIUM CHLORIDE, CALCIUM CHLORIDE 600; 310; 30; 20 MG/100ML; MG/100ML; MG/100ML; MG/100ML
50 INJECTION, SOLUTION INTRAVENOUS CONTINUOUS
Status: DISCONTINUED | OUTPATIENT
Start: 2020-09-01 | End: 2020-09-01 | Stop reason: HOSPADM

## 2020-09-01 RX ORDER — FENTANYL CITRATE/PF 50 MCG/ML
25 SYRINGE (ML) INJECTION
Status: DISCONTINUED | OUTPATIENT
Start: 2020-09-01 | End: 2020-09-01 | Stop reason: HOSPADM

## 2020-09-01 RX ORDER — BUPIVACAINE HYDROCHLORIDE 5 MG/ML
INJECTION, SOLUTION PERINEURAL AS NEEDED
Status: DISCONTINUED | OUTPATIENT
Start: 2020-09-01 | End: 2020-09-01 | Stop reason: HOSPADM

## 2020-09-01 RX ORDER — PROPOFOL 10 MG/ML
INJECTION, EMULSION INTRAVENOUS CONTINUOUS PRN
Status: DISCONTINUED | OUTPATIENT
Start: 2020-09-01 | End: 2020-09-01

## 2020-09-01 RX ORDER — MIDAZOLAM HYDROCHLORIDE 2 MG/2ML
INJECTION, SOLUTION INTRAMUSCULAR; INTRAVENOUS AS NEEDED
Status: DISCONTINUED | OUTPATIENT
Start: 2020-09-01 | End: 2020-09-01

## 2020-09-01 RX ORDER — HYDROMORPHONE HCL/PF 1 MG/ML
0.5 SYRINGE (ML) INJECTION
Status: DISCONTINUED | OUTPATIENT
Start: 2020-09-01 | End: 2020-09-01 | Stop reason: HOSPADM

## 2020-09-01 RX ORDER — EPHEDRINE SULFATE 50 MG/ML
INJECTION INTRAVENOUS AS NEEDED
Status: DISCONTINUED | OUTPATIENT
Start: 2020-09-01 | End: 2020-09-01

## 2020-09-01 RX ORDER — KETAMINE HCL IN NACL, ISO-OSM 100MG/10ML
SYRINGE (ML) INJECTION AS NEEDED
Status: DISCONTINUED | OUTPATIENT
Start: 2020-09-01 | End: 2020-09-01

## 2020-09-01 RX ORDER — PROPOFOL 10 MG/ML
INJECTION, EMULSION INTRAVENOUS AS NEEDED
Status: DISCONTINUED | OUTPATIENT
Start: 2020-09-01 | End: 2020-09-01

## 2020-09-01 RX ORDER — LIDOCAINE HYDROCHLORIDE 10 MG/ML
INJECTION, SOLUTION EPIDURAL; INFILTRATION; INTRACAUDAL; PERINEURAL AS NEEDED
Status: DISCONTINUED | OUTPATIENT
Start: 2020-09-01 | End: 2020-09-01

## 2020-09-01 RX ORDER — FENTANYL CITRATE 50 UG/ML
INJECTION, SOLUTION INTRAMUSCULAR; INTRAVENOUS AS NEEDED
Status: DISCONTINUED | OUTPATIENT
Start: 2020-09-01 | End: 2020-09-01

## 2020-09-01 RX ORDER — LIDOCAINE HYDROCHLORIDE 20 MG/ML
INJECTION, SOLUTION INFILTRATION; PERINEURAL AS NEEDED
Status: DISCONTINUED | OUTPATIENT
Start: 2020-09-01 | End: 2020-09-01 | Stop reason: HOSPADM

## 2020-09-01 RX ADMIN — FENTANYL CITRATE 50 MCG: 50 INJECTION INTRAMUSCULAR; INTRAVENOUS at 13:58

## 2020-09-01 RX ADMIN — OXYCODONE HYDROCHLORIDE AND ACETAMINOPHEN 1 TABLET: 5; 325 TABLET ORAL at 17:01

## 2020-09-01 RX ADMIN — PROPOFOL 100 MCG/KG/MIN: 10 INJECTION, EMULSION INTRAVENOUS at 13:44

## 2020-09-01 RX ADMIN — PROPOFOL 50 MG: 10 INJECTION, EMULSION INTRAVENOUS at 13:44

## 2020-09-01 RX ADMIN — PROPOFOL 50 MG: 10 INJECTION, EMULSION INTRAVENOUS at 13:58

## 2020-09-01 RX ADMIN — PHENYLEPHRINE HYDROCHLORIDE 100 MCG: 10 INJECTION INTRAVENOUS at 14:10

## 2020-09-01 RX ADMIN — FENTANYL CITRATE 50 MCG: 50 INJECTION INTRAMUSCULAR; INTRAVENOUS at 13:44

## 2020-09-01 RX ADMIN — LIDOCAINE HYDROCHLORIDE 40 MG: 10 INJECTION, SOLUTION EPIDURAL; INFILTRATION; INTRACAUDAL; PERINEURAL at 13:43

## 2020-09-01 RX ADMIN — EPHEDRINE SULFATE 5 MG: 50 INJECTION, SOLUTION INTRAVENOUS at 14:25

## 2020-09-01 RX ADMIN — Medication 20 MG: at 14:04

## 2020-09-01 RX ADMIN — MIDAZOLAM HYDROCHLORIDE 2 MG: 1 INJECTION, SOLUTION INTRAMUSCULAR; INTRAVENOUS at 13:33

## 2020-09-01 RX ADMIN — EPHEDRINE SULFATE 5 MG: 50 INJECTION, SOLUTION INTRAVENOUS at 13:57

## 2020-09-01 RX ADMIN — SODIUM CHLORIDE, SODIUM LACTATE, POTASSIUM CHLORIDE, AND CALCIUM CHLORIDE: .6; .31; .03; .02 INJECTION, SOLUTION INTRAVENOUS at 13:19

## 2020-09-01 RX ADMIN — CEFAZOLIN SODIUM 2000 MG: 2 SOLUTION INTRAVENOUS at 13:33

## 2020-09-01 RX ADMIN — EPHEDRINE SULFATE 10 MG: 50 INJECTION, SOLUTION INTRAVENOUS at 14:09

## 2020-09-01 RX ADMIN — PHENYLEPHRINE HYDROCHLORIDE 100 MCG: 10 INJECTION INTRAVENOUS at 14:25

## 2020-09-01 NOTE — ANESTHESIA POSTPROCEDURE EVALUATION
Post-Op Assessment Note    CV Status:  Stable    Pain management: adequate     Mental Status:  Awake and sleepy   Hydration Status:  Euvolemic   PONV Controlled:  Controlled   Airway Patency:  Patent      Post Op Vitals Reviewed: Yes      Staff: CRNA   Comments: pacu rn made aware of pt having pain in right shoulder preprocedure and that we where getting bp on left arm because of that          No complications documented      /63 (09/01/20 1449)    Temp (!) 96 8 °F (36 °C) (09/01/20 1449)    Pulse 71 (09/01/20 1449)   Resp 20 (09/01/20 1449)    SpO2 100 % (09/01/20 1449)

## 2020-09-01 NOTE — NURSING NOTE
States that she gets pain in right upper arm  Not sure why  She denies injury to the area  She mentioned that she will be seeing the doctor sometime after this surgery

## 2020-09-01 NOTE — INTERVAL H&P NOTE
H&P reviewed  After examining the patient I find no changes in the patients condition since the H&P had been written      Vitals:    09/01/20 1146   BP: 115/69   Pulse: 67   Resp: 16   Temp: (!) 97 2 °F (36 2 °C)   SpO2: 99%

## 2020-09-01 NOTE — OP NOTE
OPERATIVE REPORT - Podiatry  PATIENT NAME: Lillie Guzmán    :  1970  MRN: [id number]  Pt Location:  OR ROOM 10    SURGERY DATE: 2020    Surgeon(s) and Role:     * Cherise Sanders DPM - Primary     * Deepa Lockhart DPM - Assisting    Pre-op Diagnosis:  Bunion of right foot [M21 611]    Post-Op Diagnosis Codes:     * Bunion of right foot [M21 611]    Procedure(s) (LRB):  BUNIONECTOMY OLIVIA (Right)    Specimen(s):  * No specimens in log *    Estimated Blood Loss:   Minimal    Drains:  * No LDAs found *    Anesthesia Type:   IV Sedation with Anesthesia with 8 ml of 2% Lidocaine plain  Postoperatively 8 cc of 0 5% Marcaine plain    Hemostasis:  Consistent with diagnosis    Materials:  Implant Name Type Inv  Item Serial No   Lot No  LRB No  Used Action   SCREW  RAMIRO 3 X 15MM ASNIS MICRO - [id number] Screw SCREW  RAMIRO 3 X 15MM ASNIS MICRO  B[employer]ZE [employer]TH[employer]  Right 2 Implanted     Three 0 Vicryl, 4 Vicryl, 4 0 Prolene    Operative Findings:  Consistent with diagnosis    Complications:   None    Procedure and Technique:     Under mild sedation, the patient was brought into the operating room and placed on the operating room table in the supine position  IV sedation was achieved by anesthesia team and a universal timeout was performed where all parties are in agreement of correct patient, correct procedure and correct site  A pneumatic tourniquet was then placed over the patient's right lower extremity with ample padding  A male block was performed consisting of 8 ml of 2% Lidocaine plain  The foot was then prepped and draped in the usual aseptic manner  An esmarch bandage was used to exsangunate the foot and the pneumatic tourniquet was then inflated to 250mmHg  Attention was then directed to the dorsal aspect of the first metatarsal where an approximately 6 cm linear incision was made  The incision was deepened through the subcutaneous tissues using sharp and blunt dissection   Care was taken to identify and retract all vital neural and vascular structures  All bleeders were cauterized and ligated as necessary  A capsuloptomy was performed over the dorsal aspect of the MPJ  The periosteal and capsular structures were then carefully dissected free of their osseous attachments and reflected medially and laterally, thus exposing the head of the first metatarsal at the operative site  Attention was then directed to the first met head where the medial prominence was resected by the sagittal bone saw  A through and through V type osteotomy was made at a 60 degree angle  This cut was created in the metataphyseal region of the bone utilizing a sagittal bone saw and the apices of this osteotomy pointing proximal plantarly and proximal dorsally  Upon completion of this osteotomy, the capital fragment was distracted and shifted laterally into a more corrected position and impacted onto the shaft of the first met  K wires were used as temp fixation across the osteotomy site  With proper AO technique a  2 Ancram screws serves as fixation across the osteotomy site  Attention was directed to the remaining medial bone shelf proximal to the osteotomy site which was resected using a sagittal saw and passed from operative field  Correction of the deformity was assessed at this time and noted to be adequate  The wound was then flushed with copious amounts of sterile saline  The periosteal and capsular structures were reapproximated using 3-0 Vicryl  The subQ tissues were reapproximated using 4-0 Vicryl and the skin was reapproximated using  4-0 Prolene in a horizontal mattress suture technique  The incision was then dressed with Betadine soaked adaptic, Dry sterile dressing  The pneumatic ankle tourniquet was then deflated and a prompt hyperemic response was noted to all digits of the foot  The patient tolerated the procedure and anesthesia well and was transferred to the PACU with vital signs stable  Dr Gerri Fierro was present during the entire procedure and participated in all key aspects  Mallika Salazar DPM  DATE: September 1, 2020  TIME: 3:21 PM      Portions of the record may have been created with voice recognition software  Occasional wrong word or "sound a like" substitutions may have occurred due to the inherent limitations of voice recognition software  Read the chart carefully and recognize, using context, where substitutions have occurred

## 2020-09-01 NOTE — DISCHARGE SUMMARY
Discharge Summary Outpatient Procedure Podiatry -   Ruby Garcia 47 y o  female MRN: [id number]  Unit/Bed#: [facility] Encounter: [id number]    Admission Date: 9/1/2020     Admitting Diagnosis: Bunion of right foot [M21 611]    Discharge Diagnosis: same    Procedures Performed: Ford Im: 47352 (CPT®)    Complications: none    Condition at Discharge: stable    Discharge instructions/Information to patient and family:   See after visit summary for information provided to patient and family  Provisions for Follow-Up Care/Important appointments:  See after visit summary for information related to follow-up care and any pertinent home health orders  Discharge Medications:  See after visit summary for reconciled discharge medications provided to patient and family

## 2020-09-01 NOTE — NURSING NOTE
Elías text and phone call placed to Dr Hussein Gibson regarding pt's pain  Spoke with Dr Rashid Khan on phone and received verbal order for pain medication post-op

## 2020-09-01 NOTE — DISCHARGE INSTRUCTIONS
Dr Olivares El Instructions    1  Take your prescribed medication as directed  2  Upon arrival at home, lie down and elevate your surgical foot on 2 pillows  3  Remain quiet, off your feet as much as possible, for the first 24-48 hours  This is when your feet first swell and may become painful  After 48 hours you may begin limited walking following these restrictions:   Weightbear as tolerated to surgical foot surgical shoe  4  Drink large quantities of water  Consume no alcohol  Continue a well-balanced diet  5  Report any unusual discomfort or fever to this office  6  A limited amount of discomfort and swelling is to be expected  In some cases the skin may take on a bruised appearance  The surgical solution that was applied to your foot prior to the operation is dark in color and the operation site may appear to be oozing when it actually is not  7  A slight amount of blood is to be expected, and is no cause for alarm  Do not remove the dressings  If there is active bleeding and if the bleeding persists, add additional gauze to the bandage, apply direct pressure, elevate your feet and call this office  8  Do not get the dressings wet  As regular bathing may be inconvenient, sponge baths are recommended  9  When anesthesia wears off and if any discomfort should be present, apply an ice pack directly over the operated area for 15 minute intervals for several hours or until the pain leaves  (USE IN EXCESS OF 15 MINUTES COULD CAUSE FROSTBITE)  Do not use hot water bags or electric pads  A convenient icepack can be made by placing ice cubes in a plastic bag and covering this with a towel  10  If necessary, take a mild laxative before retiring  11  Wear your special open shoes anytime you put weight on your foot, even if it is just to walk to the bathroom and back  It will probably be 2 or 3 weeks before you will be permitted to try regular shoes    12  Having performed the operation, we are interested in a prompt recovery  Please cooperate by following the above instructions  13  Please call to confirm your post-op appointment or call with any other questions

## 2020-09-22 DIAGNOSIS — J20.9 BRONCHITIS, ACUTE: ICD-10-CM

## 2020-09-22 RX ORDER — ALBUTEROL SULFATE 90 UG/1
2 AEROSOL, METERED RESPIRATORY (INHALATION) EVERY 4 HOURS PRN
Qty: 1 INHALER | Refills: 2 | Status: SHIPPED | OUTPATIENT
Start: 2020-09-22 | End: 2021-05-25 | Stop reason: ALTCHOICE

## 2020-09-23 ENCOUNTER — APPOINTMENT (OUTPATIENT)
Dept: LAB | Facility: HOSPITAL | Age: 50
End: 2020-09-23
Payer: COMMERCIAL

## 2020-09-23 ENCOUNTER — OFFICE VISIT (OUTPATIENT)
Dept: FAMILY MEDICINE CLINIC | Facility: CLINIC | Age: 50
End: 2020-09-23
Payer: COMMERCIAL

## 2020-09-23 VITALS
BODY MASS INDEX: 23.41 KG/M2 | RESPIRATION RATE: 20 BRPM | TEMPERATURE: 98.1 F | OXYGEN SATURATION: 96 % | DIASTOLIC BLOOD PRESSURE: 80 MMHG | HEIGHT: 58 IN | HEART RATE: 69 BPM | SYSTOLIC BLOOD PRESSURE: 112 MMHG

## 2020-09-23 DIAGNOSIS — M25.50 ARTHRALGIA, UNSPECIFIED JOINT: ICD-10-CM

## 2020-09-23 DIAGNOSIS — R53.83 FATIGUE, UNSPECIFIED TYPE: ICD-10-CM

## 2020-09-23 DIAGNOSIS — M25.50 ARTHRALGIA, UNSPECIFIED JOINT: Primary | ICD-10-CM

## 2020-09-23 LAB — TESTOST SERPL-MCNC: 18 NG/DL

## 2020-09-23 PROCEDURE — 99214 OFFICE O/P EST MOD 30 MIN: CPT | Performed by: NURSE PRACTITIONER

## 2020-09-23 PROCEDURE — 86618 LYME DISEASE ANTIBODY: CPT

## 2020-09-23 PROCEDURE — 1036F TOBACCO NON-USER: CPT | Performed by: NURSE PRACTITIONER

## 2020-09-23 PROCEDURE — 86430 RHEUMATOID FACTOR TEST QUAL: CPT

## 2020-09-23 PROCEDURE — 36415 COLL VENOUS BLD VENIPUNCTURE: CPT

## 2020-09-23 PROCEDURE — 84403 ASSAY OF TOTAL TESTOSTERONE: CPT

## 2020-09-23 PROCEDURE — 86038 ANTINUCLEAR ANTIBODIES: CPT

## 2020-09-23 RX ORDER — CALCIUM/D3/MAG OX/COP/MANG/ZN 300 MG-20
1 TABLET ORAL 2 TIMES DAILY
Qty: 60 TABLET | Refills: 1 | Status: SHIPPED | OUTPATIENT
Start: 2020-09-23 | End: 2021-09-20

## 2020-09-23 NOTE — PROGRESS NOTES
Assessment/Plan:    Arthralgia  -Ordering lab workup to rule out cause of arthralgias  Diagnoses and all orders for this visit:    Arthralgia, unspecified joint  -     VANESSA Screen w/ Reflex to Titer/Pattern; Future  -     RF Screen w/ Reflex to Titer; Future  -     Lyme Antibody Profile with reflex to WB; Future  -     Testosterone; Future    Fatigue, unspecified type  -     Calcium Carbonate-Vit D-Min (CALTRATE 600+D PLUS MINIS) 300-800 MG-UNIT TABS; Take 1 tablet by mouth 2 (two) times a day    Other orders  -     Cancel: Mammo screening bilateral w 3d & cad; Future          Subjective:      Patient ID: Lillie Guzmán is a 47 y o  female  47year old female patient here due to arthralgias  She recently had right foot surgery due to bunion of her right foot  She is currently walking in a boot  Today she reports returning to office due to reading about feeling arthralgias for a year now per patient  States its all her body joints that hurt her  She knows she is not young anymore but states taking medications take the edge off the pain but does not completely relieve it  Today she would like a work up and her testosterone levels checked as well  Fatigue   This is a recurrent problem  The current episode started more than 1 year ago  The problem occurs constantly  The problem has been gradually worsening  Associated symptoms include arthralgias, fatigue, joint swelling and weakness  Pertinent negatives include no congestion, coughing, headaches, neck pain, numbness, rash, vertigo or vomiting  Exacerbated by: climate  She has tried oral narcotics for the symptoms  The treatment provided moderate relief  The following portions of the patient's history were reviewed and updated as appropriate: allergies, current medications, past family history, past medical history, past social history, past surgical history and problem list     Review of Systems   Constitutional: Positive for fatigue     HENT: Negative  Negative for congestion  Eyes: Negative  Respiratory: Negative  Negative for cough  Cardiovascular: Negative  Gastrointestinal: Negative  Negative for vomiting  Endocrine: Negative  Genitourinary: Negative  Musculoskeletal: Positive for arthralgias, gait problem and joint swelling  Negative for neck pain  Skin: Negative  Negative for rash  Allergic/Immunologic: Negative  Neurological: Positive for weakness  Negative for dizziness, vertigo, numbness and headaches  Hematological: Negative  Psychiatric/Behavioral: Negative  Objective:      /80 (BP Location: Left arm, Patient Position: Sitting, Cuff Size: Standard)   Pulse 69   Temp 98 1 °F (36 7 °C) (Temporal)   Resp 20   Ht 4' 10" (1 473 m)   SpO2 96%   BMI 23 41 kg/m²          Physical Exam  Vitals signs and nursing note reviewed  Constitutional:       General: She is not in acute distress  Appearance: Normal appearance  She is not ill-appearing, toxic-appearing or diaphoretic  HENT:      Head: Normocephalic and atraumatic  Right Ear: External ear normal       Left Ear: External ear normal       Nose: Nose normal  No congestion or rhinorrhea  Mouth/Throat:      Mouth: Mucous membranes are moist       Pharynx: Oropharynx is clear  No oropharyngeal exudate  Eyes:      Extraocular Movements: Extraocular movements intact  Conjunctiva/sclera: Conjunctivae normal       Pupils: Pupils are equal, round, and reactive to light  Neck:      Musculoskeletal: Normal range of motion and neck supple  Cardiovascular:      Rate and Rhythm: Normal rate and regular rhythm  Pulses: Normal pulses  Heart sounds: Normal heart sounds  Pulmonary:      Effort: Pulmonary effort is normal  No respiratory distress  Breath sounds: Normal breath sounds  Abdominal:      General: Bowel sounds are normal       Palpations: Abdomen is soft     Musculoskeletal:         General: Tenderness (generalized joint pain) present  Legs:    Skin:     General: Skin is warm and dry  Capillary Refill: Capillary refill takes less than 2 seconds  Neurological:      General: No focal deficit present  Mental Status: She is alert and oriented to person, place, and time  Psychiatric:         Mood and Affect: Mood normal          Behavior: Behavior normal          Thought Content:  Thought content normal          Judgment: Judgment normal

## 2020-09-24 LAB
B BURGDOR IGG+IGM SER-ACNC: <0.91 ISR (ref 0–0.9)
RHEUMATOID FACT SER QL LA: NEGATIVE

## 2020-09-25 DIAGNOSIS — M25.50 ARTHRALGIA, UNSPECIFIED JOINT: Primary | ICD-10-CM

## 2020-09-25 LAB — RYE IGE QN: NEGATIVE

## 2020-09-28 ENCOUNTER — TELEPHONE (OUTPATIENT)
Dept: ADMINISTRATIVE | Facility: OTHER | Age: 50
End: 2020-09-28

## 2020-09-28 NOTE — TELEPHONE ENCOUNTER
----- Message from Vianey Perry sent at 9/25/2020  1:55 PM EDT -----  Regarding: mammogram  09/25/20 1:56 PM    Hello, our patient Lillie Guzmán has had Mammogram completed/performed  Please assist in updating the patient chart by pulling the Care Everywhere (CE) document   The date of service is 8/5/20    Thank you,  Dieudonne Gongora   [address]

## 2020-09-28 NOTE — TELEPHONE ENCOUNTER
Upon review of the In Basket request we were able to locate, review, and update the patient chart as requested for Mammogram     Any additional questions or concerns should be emailed to the Practice Liaisons via [email or web address] email, please do not reply via In Basket      Thank you  Meghna Escobar

## 2020-10-06 ENCOUNTER — TELEPHONE (OUTPATIENT)
Dept: OBGYN CLINIC | Facility: HOSPITAL | Age: 50
End: 2020-10-06

## 2021-01-26 ENCOUNTER — HOSPITAL ENCOUNTER (EMERGENCY)
Facility: HOSPITAL | Age: 51
Discharge: HOME | End: 2021-01-26
Attending: EMERGENCY MEDICINE | Admitting: EMERGENCY MEDICINE
Payer: COMMERCIAL

## 2021-01-26 ENCOUNTER — NURSE TRIAGE (OUTPATIENT)
Dept: PHYSICAL THERAPY | Facility: OTHER | Age: 51
End: 2021-01-26

## 2021-01-26 ENCOUNTER — APPOINTMENT (EMERGENCY)
Dept: RADIOLOGY | Facility: HOSPITAL | Age: 51
End: 2021-01-26
Payer: COMMERCIAL

## 2021-01-26 VITALS
OXYGEN SATURATION: 98 % | HEART RATE: 67 BPM | TEMPERATURE: 97.5 F | RESPIRATION RATE: 18 BRPM | DIASTOLIC BLOOD PRESSURE: 81 MMHG | SYSTOLIC BLOOD PRESSURE: 127 MMHG | BODY MASS INDEX: 23.41 KG/M2 | WEIGHT: 111.99 LBS

## 2021-01-26 DIAGNOSIS — M54.2 ACUTE NECK PAIN: Primary | ICD-10-CM

## 2021-01-26 DIAGNOSIS — M54.2 NECK PAIN: Primary | ICD-10-CM

## 2021-01-26 PROCEDURE — 99284 EMERGENCY DEPT VISIT MOD MDM: CPT | Performed by: EMERGENCY MEDICINE

## 2021-01-26 PROCEDURE — 99284 EMERGENCY DEPT VISIT MOD MDM: CPT

## 2021-01-26 PROCEDURE — 96372 THER/PROPH/DIAG INJ SC/IM: CPT

## 2021-01-26 PROCEDURE — 72125 CT NECK SPINE W/O DYE: CPT

## 2021-01-26 PROCEDURE — G1004 CDSM NDSC: HCPCS

## 2021-01-26 RX ORDER — LIDOCAINE 50 MG/G
1 PATCH TOPICAL ONCE
Status: DISCONTINUED | OUTPATIENT
Start: 2021-01-26 | End: 2021-01-26 | Stop reason: HOSPADM

## 2021-01-26 RX ORDER — METHOCARBAMOL 500 MG/1
500 TABLET, FILM COATED ORAL 2 TIMES DAILY
Qty: 10 TABLET | Refills: 0 | Status: SHIPPED | OUTPATIENT
Start: 2021-01-26 | End: 2021-05-25 | Stop reason: ALTCHOICE

## 2021-01-26 RX ORDER — KETOROLAC TROMETHAMINE 30 MG/ML
15 INJECTION, SOLUTION INTRAMUSCULAR; INTRAVENOUS ONCE
Status: COMPLETED | OUTPATIENT
Start: 2021-01-26 | End: 2021-01-26

## 2021-01-26 RX ORDER — OXYCODONE HYDROCHLORIDE 5 MG/1
5 TABLET ORAL ONCE
Status: COMPLETED | OUTPATIENT
Start: 2021-01-26 | End: 2021-01-26

## 2021-01-26 RX ORDER — DIAZEPAM 5 MG/1
5 TABLET ORAL ONCE
Status: COMPLETED | OUTPATIENT
Start: 2021-01-26 | End: 2021-01-26

## 2021-01-26 RX ADMIN — DIAZEPAM 5 MG: 5 TABLET ORAL at 06:17

## 2021-01-26 RX ADMIN — LIDOCAINE 1 PATCH: 50 PATCH TOPICAL at 07:17

## 2021-01-26 RX ADMIN — KETOROLAC TROMETHAMINE 15 MG: 30 INJECTION, SOLUTION INTRAMUSCULAR; INTRAVENOUS at 06:15

## 2021-01-26 RX ADMIN — OXYCODONE HYDROCHLORIDE 5 MG: 5 TABLET ORAL at 07:16

## 2021-01-26 NOTE — TELEPHONE ENCOUNTER
Additional Information   Negative: Is this related to a work injury?  Negative: Is this related to an MVA?  Negative: Are you currently recieving homecare services?  Negative: Has the patient had unexplained weight loss?  Negative: Does the patient have a fever?  Negative: Is the patient experiencing urine retention?  Negative: Is the patient experiencing blood in sputum?  Negative: Is the patient experiencing acute drop foot or paralysis?  Negative: Has the patient experienced major trauma? (fall from height, high speed collision, direct blow to spine) and is also experiencing nausea, light-headedness, or loss of consciousness?  Negative: Is this a chronic condition? Background - Initial Assessment  Clinical complaint: right side neck pain which radiates down the right arm to the elbow  States she woke up early this morning with this severe pain and went to the ED  States she had been having intermittent mild arm pain "for few months "  Date of onset: 1/26/21  Frequency of pain: constant  Quality of pain: sharp and shooting    Protocols used: [facility] COMPREHENSIVE SPINE PROGRAM PROTOCOL    This RN did review in detail the Comprehensive Spine Program and what we can provide for their neck pain  Patient is agreeable to being triaged by this RN and would like to proceed with Physical Therapy  Referral was placed for Physical Therapy at the [facility]  Patients information was sent to the  to make evaluation appointment  Patient made aware that the PT office  will be calling to schedule the appointment  Patient was provided with the phone number to the PT office  No further questions and/or concerns were voiced by the patient at this time  Patient states understanding of the referral that was placed

## 2021-01-26 NOTE — Clinical Note
Monique Alcaraz was seen and treated in our emergency department on 1/26/2021  Diagnosis:     Luis Enrique Humphries  may return to work on return date  She may return on this date: 01/28/2021         If you have any questions or concerns, please don't hesitate to call        Britton Townsend MD    ______________________________           _______________          _______________  Hospital Representative                              Date                                Time

## 2021-01-26 NOTE — ED NOTES
Patient transported to [address] [place] [address], [address] [place] [address]  01/26/21 9616

## 2021-01-26 NOTE — ED PROVIDER NOTES
History  Chief Complaint   Patient presents with    Neck Pain     Patient states that she woke up at 3 am this morning with right neck pain/stiffiness  States that it radiates down her right arm  States that she has been having intermittent right arm pain for months  50-year-old female with no significant past medical history presents to the ER for evaluation of right-sided neck pain, radiating to the shoulder and arm  She reports that she woke from sleep at 3:00 AM to use the bathroom, which is when she noticed pain and stiffness on the right side of her neck  She has also been experiencing intermittent right arm pain for several months  She denies any recent injuries or heavy lifting  Prior to Admission Medications   Prescriptions Last Dose Informant Patient Reported? Taking?    Calcium Carbonate-Vit D-Min (CALTRATE 600+D PLUS MINIS) 300-800 MG-UNIT TABS Not Taking at Unknown time  No No   Sig: Take 1 tablet by mouth 2 (two) times a day   Patient not taking: Reported on 1/26/2021   Diclofenac Sodium 1 5 % SOLN Not Taking at Unknown time  Yes No   Sig: APPLY UP TO 40 DROPS TO THE AFFECTED AREAS 3-4 TIMES DAILY AS NEEDED FOR PAIN   PREMARIN 0 3 MG tablet Not Taking at Unknown time  Yes No   Promethazine-DM (PHENERGAN-DM) 6 25-15 mg/5 mL oral syrup Not Taking at Unknown time  No No   Sig: Take 5 mL by mouth 4 (four) times a day as needed for cough   Patient not taking: Reported on 5/21/2020   acetaminophen (TYLENOL) 500 mg tablet   Yes No   Sig: take 1 tablet by mouth every 6 hours if needed for pain   albuterol (PROVENTIL HFA,VENTOLIN HFA) 90 mcg/act inhaler Not Taking at Unknown time  No No   Sig: Inhale 2 puffs every 4 (four) hours as needed for wheezing   Patient not taking: Reported on 1/26/2021   benzonatate (TESSALON PERLES) 100 mg capsule Not Taking at Unknown time  No No   Sig: Take 1 capsule (100 mg total) by mouth every 8 (eight) hours   Patient not taking: Reported on 5/21/2020   conjugated estrogens (PREMARIN) 0 3 mg tablet Not Taking at Unknown time  Yes No   Sig: Take 0 3 mg by mouth daily   fluticasone (FLONASE) 50 mcg/act nasal spray Not Taking at Unknown time  No No   Si sprays into each nostril daily   Patient not taking: Reported on 2020   hydrOXYzine HCL (ATARAX) 25 mg tablet Not Taking at Unknown time  No No   Sig: Take 1 tablet (25 mg total) by mouth daily at bedtime   Patient not taking: Reported on 2021   ibuprofen (MOTRIN) 600 mg tablet Not Taking at Unknown time  No No   Sig: Take 1 tablet (600 mg total) by mouth every 6 (six) hours as needed for mild pain or moderate pain   Patient not taking: Reported on 2020   lidocaine (XYLOCAINE) 5 % ointment Not Taking at Unknown time  Yes No   Sig: Apply 2-3 grams to the affected area 3-4 times daily   loperamide (IMODIUM) 2 mg capsule Not Taking at Unknown time  No No   Sig: Take 1 capsule (2 mg total) by mouth 4 (four) times a day as needed for diarrhea   Patient not taking: Reported on 2020      Facility-Administered Medications: None       Past Medical History:   Diagnosis Date    Anemia     Anxiety     History of transfusion 2017    due to vag bleeding    Insomnia     Kidney stone     Mass of breast, left     Mass of breast, right     Seasonal allergies        Past Surgical History:   Procedure Laterality Date    COLONOSCOPY      HYSTERECTOMY      HYSTEROSCOPY      KIDNEY STONE SURGERY      LAPAROSCOPIC CHOLECYSTECTOMY      MN CORRJ HALLUX VALGUS W/SESMDC W/DIST METAR OSTEOT Right 2020    Procedure: Devota Jose;  Surgeon: Олег Parkinson DPM;  Location: [facility] MAIN OR;  Service: Podiatry    TUBAL LIGATION         Family History   Problem Relation Age of Onset    Hypertension Mother     Heart disease Father     Breast cancer Maternal Aunt     Pancreatic cancer Maternal Uncle     Stomach cancer Paternal Uncle      I have reviewed and agree with the history as documented      E-Cigarette/Vaping  E-Cigarette Use Never User      E-Cigarette/Vaping Substances     Social History     Tobacco Use    Smoking status: Never Smoker    Smokeless tobacco: Never Used   Substance Use Topics    Alcohol use: Not Currently     Alcohol/week: 1 0 standard drinks     Types: 1 Glasses of wine per week     Frequency: Monthly or less    Drug use: Never        Review of Systems   Constitutional: Negative for chills and fever  HENT: Negative for congestion and sore throat  Respiratory: Negative for cough and shortness of breath  Cardiovascular: Negative for chest pain and palpitations  Gastrointestinal: Negative for abdominal pain, diarrhea, nausea and vomiting  Genitourinary: Negative for dysuria and hematuria  Musculoskeletal: Positive for neck pain  Negative for back pain  Neurological: Negative for weakness, light-headedness, numbness and headaches  All other systems reviewed and are negative  Physical Exam  ED Triage Vitals [01/26/21 0544]   Temperature Pulse Respirations Blood Pressure SpO2   97 5 °F (36 4 °C) 67 18 127/81 98 %      Temp Source Heart Rate Source Patient Position - Orthostatic VS BP Location FiO2 (%)   Oral Monitor Lying Left arm --      Pain Score       Worst Possible Pain             Orthostatic Vital Signs  Vitals:    01/26/21 0544   BP: 127/81   Pulse: 67   Patient Position - Orthostatic VS: Lying       Physical Exam  Vitals signs and nursing note reviewed  Constitutional:       General: She is not in acute distress  Appearance: She is normal weight  HENT:      Head: Normocephalic and atraumatic  Right Ear: External ear normal       Left Ear: External ear normal       Nose: Nose normal       Mouth/Throat:      Mouth: Mucous membranes are moist       Pharynx: Oropharynx is clear  No oropharyngeal exudate or posterior oropharyngeal erythema  Eyes:      Extraocular Movements: Extraocular movements intact        Conjunctiva/sclera: Conjunctivae normal       Pupils: Pupils are equal, round, and reactive to light  Neck:      Musculoskeletal: No neck rigidity  Comments: Right-sided neck/trapezius muscle spasm  No midline cervical tenderness  Cardiovascular:      Rate and Rhythm: Normal rate and regular rhythm  Pulses: Normal pulses  Heart sounds: Normal heart sounds  Pulmonary:      Effort: Pulmonary effort is normal       Breath sounds: Normal breath sounds  No wheezing or rales  Abdominal:      General: Abdomen is flat  Bowel sounds are normal       Palpations: Abdomen is soft  Tenderness: There is no abdominal tenderness  There is no right CVA tenderness, left CVA tenderness or guarding  Musculoskeletal: Normal range of motion  General: No swelling or tenderness  Comments: No midline CTLS spine tenderness  Limited active range of motion of the right shoulder and neck secondary to pain, but full passive range of motion of right shoulder  No signs of trauma or bony abnormality  Lymphadenopathy:      Cervical: No cervical adenopathy  Skin:     General: Skin is warm and dry  Capillary Refill: Capillary refill takes less than 2 seconds  Neurological:      General: No focal deficit present  Mental Status: She is alert and oriented to person, place, and time  Sensory: No sensory deficit  Motor: No weakness  Comments: Bilateral upper extremity  strength 5/5 and symmetric, bilateral upper extremity distal sensation intact and symmetric  No focal deficits           ED Medications  Medications   ketorolac (TORADOL) injection 15 mg (15 mg Intramuscular Given 1/26/21 0615)   diazepam (VALIUM) tablet 5 mg (5 mg Oral Given 1/26/21 0617)   oxyCODONE (ROXICODONE) IR tablet 5 mg (5 mg Oral Given 1/26/21 0716)       Diagnostic Studies  Results Reviewed     None                 CT spine cervical without contrast   Final Result by Anatoly Tang MD (01/26 9331)      No cervical spine fracture or traumatic malalignment  Progressive degenerative changes at C5-6 with stable minimal left neuroforaminal narrowing  No significant spinal canal stenosis     Workstation performed: [id number]               Procedures  Procedures      ED Course  ED Course as of Jan 30 1310   Tue Jan 26, 2021   5964 Right-sided neck pain persists after Toradol and Valium  Adding Lidoderm patch and p o  Oxycodone  Will also obtain CT cervical spine without contrast                                 SBIRT 20yo+      Most Recent Value   SBIRT (23 yo +)   In order to provide better care to our patients, we are screening all of our patients for alcohol and drug use  Would it be okay to ask you these screening questions? Yes Filed at: 01/26/2021 0549   Initial Alcohol Screen: US AUDIT-C    1  How often do you have a drink containing alcohol?  0 Filed at: 01/26/2021 0549   2  How many drinks containing alcohol do you have on a typical day you are drinking? 0 Filed at: 01/26/2021 0549   3a  Male UNDER 65: How often do you have five or more drinks on one occasion? 0 Filed at: 01/26/2021 0549   3b  FEMALE Any Age, or MALE 65+: How often do you have 4 or more drinks on one occassion? 0 Filed at: 01/26/2021 0549   Audit-C Score  0 Filed at: 01/26/2021 1211   DEMOND: How many times in the past year have you    Used an illegal drug or used a prescription medication for non-medical reasons? Never Filed at: 01/26/2021 0549                MDM  Number of Diagnoses or Management Options  Neck pain:   Diagnosis management comments: 48-year-old female with no significant past medical history presents to the ER for evaluation of right-sided neck pain, radiating to the shoulder and arm  She reports that she woke from sleep at 3:00 AM to use the bathroom, which is when she noticed pain and stiffness on the right side of her neck  She has also been experiencing intermittent right arm pain for several months   She denies any recent injuries or heavy lifting  On exam she is afebrile, VSS, with right-sided neck/trapezius muscle spasm  No midline cervical tenderness  Bilateral upper extremity  strength 5/5 and symmetric, bilateral upper extremity distal sensation intact and symmetric  No focal deficits  Symptoms treated with Toradol, Valium, oxycodone, and Lidoderm patch  CT scan of the cervical spine reveals no cervical spine fracture or traumatic malalignment; progressive degenerative changes at C5-6 with stable minimal left neuroforaminal narrowing  No significant spinal canal stenosis  Discussed findings, red flags/return precautions, and outpatient follow-up with comprehensive spine and the patient understands and agrees  Disposition  Final diagnoses:   Neck pain     Time reflects when diagnosis was documented in both MDM as applicable and the Disposition within this note     Time User Action Codes Description Comment    1/26/2021  7:44 AM Ayana Wall Add [M54 2] Neck pain       ED Disposition     ED Disposition Condition Date/Time Comment    Discharge Stable Tue Jan 26, 2021  7:44 AM Brittney Durham discharge to home/self care              Follow-up Information     Follow up With Specialties Details Why Contact Info Additional Information    ROJELIO Contreras Nurse Practitioner   [address] [place] [address] 305  [address]       [facility] [facility] Emergency Department Emergency Medicine  If symptoms worsen [address]  [address] [facility] Emergency Department, [address], [place], [place], [address] [id number] Program Physical Therapy Schedule an appointment as soon as possible for a visit   [phone]          Discharge Medication List as of 1/26/2021  7:45 AM      CONTINUE these medications which have NOT CHANGED    Details   acetaminophen (TYLENOL) 500 mg tablet take 1 tablet by mouth every 6 hours if needed for pain, Historical Med      albuterol (PROVENTIL HFA,VENTOLIN HFA) 90 mcg/act inhaler Inhale 2 puffs every 4 (four) hours as needed for wheezing, Starting Tue 9/22/2020, Print      benzonatate (TESSALON PERLES) 100 mg capsule Take 1 capsule (100 mg total) by mouth every 8 (eight) hours, Starting Tue 9/24/2019, Print      Calcium Carbonate-Vit D-Min (CALTRATE 600+D PLUS MINIS) 300-800 MG-UNIT TABS Take 1 tablet by mouth 2 (two) times a day, Starting Wed 9/23/2020, Normal      !! conjugated estrogens (PREMARIN) 0 3 mg tablet Take 0 3 mg by mouth daily, Starting Thu 5/9/2019, Historical Med      Diclofenac Sodium 1 5 % SOLN APPLY UP TO 40 DROPS TO THE AFFECTED AREAS 3-4 TIMES DAILY AS NEEDED FOR PAIN, Historical Med      fluticasone (FLONASE) 50 mcg/act nasal spray 2 sprays into each nostril daily, Starting Mon 10/21/2019, Normal      hydrOXYzine HCL (ATARAX) 25 mg tablet Take 1 tablet (25 mg total) by mouth daily at bedtime, Starting Thu 5/21/2020, Normal      ibuprofen (MOTRIN) 600 mg tablet Take 1 tablet (600 mg total) by mouth every 6 (six) hours as needed for mild pain or moderate pain, Starting Mon 8/26/2019, Normal      lidocaine (XYLOCAINE) 5 % ointment Apply 2-3 grams to the affected area 3-4 times daily, Historical Med      loperamide (IMODIUM) 2 mg capsule Take 1 capsule (2 mg total) by mouth 4 (four) times a day as needed for diarrhea, Starting Tue 4/7/2020, Normal      !! PREMARIN 0 3 MG tablet Starting Fri 5/10/2019, Historical Med      Promethazine-DM (PHENERGAN-DM) 6 25-15 mg/5 mL oral syrup Take 5 mL by mouth 4 (four) times a day as needed for cough, Starting Tue 4/7/2020, Normal       !! - Potential duplicate medications found  Please discuss with provider  No discharge procedures on file  PDMP Review     None           ED Provider  Attending physically available and evaluated Everett Ramirez I managed the patient along with the ED Attending      Electronically Signed by

## 2021-01-28 NOTE — ED ATTENDING ATTESTATION
1/26/2021  IVon MD, saw and evaluated the patient  I have discussed the patient with the resident/non-physician practitioner and agree with the resident's/non-physician practitioner's findings, Plan of Care, and MDM as documented in the resident's/non-physician practitioner's note, except where noted  All available labs and Radiology studies were reviewed  I was present for key portions of any procedure(s) performed by the resident/non-physician practitioner and I was immediately available to provide assistance  At this point I agree with the current assessment done in the Emergency Department  I have conducted an independent evaluation of this patient a history and physical is as follows:    ED Course     Patient presents for evaluation due to sudden onset of right-sided neck pain radiating to shoulder and arm  Patient states that symptoms started when she woke from sleep at 3:00 a m  Patsi Reil Patient denies any trauma, fevers, or headache  Additional complaints  A/P:  Neck pain, likely muscular  Will treat with Toradol and Valium and reassess  Symptoms persist after treatment, will add Lidoderm patch and oxycodone  Will CT C-spine      Critical Care Time  Procedures

## 2021-05-25 ENCOUNTER — OFFICE VISIT (OUTPATIENT)
Dept: FAMILY MEDICINE CLINIC | Facility: CLINIC | Age: 51
End: 2021-05-25

## 2021-05-25 VITALS
SYSTOLIC BLOOD PRESSURE: 121 MMHG | TEMPERATURE: 97.5 F | RESPIRATION RATE: 20 BRPM | DIASTOLIC BLOOD PRESSURE: 78 MMHG | HEART RATE: 76 BPM | WEIGHT: 118.6 LBS | OXYGEN SATURATION: 97 % | HEIGHT: 58 IN | BODY MASS INDEX: 24.9 KG/M2

## 2021-05-25 DIAGNOSIS — R59.0 LYMPHADENOPATHY OF LEFT CERVICAL REGION: Primary | ICD-10-CM

## 2021-05-25 DIAGNOSIS — Z12.12 SCREENING FOR COLORECTAL CANCER: ICD-10-CM

## 2021-05-25 DIAGNOSIS — Z12.11 SCREENING FOR COLORECTAL CANCER: ICD-10-CM

## 2021-05-25 DIAGNOSIS — Z12.31 ENCOUNTER FOR SCREENING MAMMOGRAM FOR MALIGNANT NEOPLASM OF BREAST: ICD-10-CM

## 2021-05-25 DIAGNOSIS — M79.7 FIBROMYALGIA: ICD-10-CM

## 2021-05-25 PROBLEM — R41.89 COGNITIVE DECLINE: Status: RESOLVED | Noted: 2019-05-23 | Resolved: 2021-05-25

## 2021-05-25 PROBLEM — Z01.818 PREOPERATIVE CLEARANCE: Status: RESOLVED | Noted: 2020-08-28 | Resolved: 2021-05-25

## 2021-05-25 PROBLEM — H91.90 HEARING LOSS: Status: RESOLVED | Noted: 2019-05-24 | Resolved: 2021-05-25

## 2021-05-25 PROBLEM — H57.89 EYE REDNESS: Status: RESOLVED | Noted: 2020-02-17 | Resolved: 2021-05-25

## 2021-05-25 PROBLEM — R52 GENERALIZED BODY ACHES: Status: RESOLVED | Noted: 2019-08-26 | Resolved: 2021-05-25

## 2021-05-25 PROBLEM — Z20.828 EXPOSURE TO SARS-ASSOCIATED CORONAVIRUS: Status: RESOLVED | Noted: 2020-04-07 | Resolved: 2021-05-25

## 2021-05-25 PROBLEM — F41.9 ANXIETY: Status: RESOLVED | Noted: 2020-05-21 | Resolved: 2021-05-25

## 2021-05-25 PROBLEM — R05.9 COUGH: Status: RESOLVED | Noted: 2019-09-24 | Resolved: 2021-05-25

## 2021-05-25 PROBLEM — J06.9 UPPER RESPIRATORY TRACT INFECTION: Status: RESOLVED | Noted: 2019-08-27 | Resolved: 2021-05-25

## 2021-05-25 PROBLEM — R19.7 DIARRHEA: Status: RESOLVED | Noted: 2020-04-07 | Resolved: 2021-05-25

## 2021-05-25 PROBLEM — R06.02 SHORTNESS OF BREATH: Status: RESOLVED | Noted: 2020-05-21 | Resolved: 2021-05-25

## 2021-05-25 PROCEDURE — 99213 OFFICE O/P EST LOW 20 MIN: CPT | Performed by: PHYSICIAN ASSISTANT

## 2021-05-25 NOTE — ASSESSMENT & PLAN NOTE
Last Hays has had pain in multiple areas of her body for years  Most recently, her arms and neck have been bothering her  She has been seeing a chiropractor, and she reports that it is helping her pain  She takes Tylenol occasionally for pain  Reviewed her previous labs; her Lyme, RF, and VANESSA were normal  Given the benign workup and physical exam and the chronicity of her symptoms, fibromyalgia seems the most likely diagnosis  Education provided during visit regarding fibromyalgia physiology and treatment options  Recommended exercise and to continue seeing the chiropractor since that is helping her pain  She may use Tylenol for pain as needed  aLst Hays would like to avoid taking medication at this time since her symptoms are relatively well controlled  She is aware that there are options for medical treatment that we can talk about further in the future if her pain becomes uncontrolled  She expressed agreement and understanding of the plan

## 2021-05-25 NOTE — ASSESSMENT & PLAN NOTE
On exam, small 1-cm nodule palpated in L cervical region  Nodule is nontender, mobile, rubbery  Nodule was first noticed within a week of receiving her second COVID vaccine  She got the vaccine in her L arm  It is possible that this lymphadenopathy was part of her body's immune response to the vaccine  Now that it is resolving, recommend observation  If it begins to get larger again, I encouraged Clau De La Vega to make an appointment at that time for re-evaluation

## 2021-05-25 NOTE — PROGRESS NOTES
Assessment/Plan:    Lymphadenopathy of left cervical region  On exam, small 1-cm nodule palpated in L cervical region  Nodule is nontender, mobile, rubbery  Nodule was first noticed within a week of receiving her second COVID vaccine  She got the vaccine in her L arm  It is possible that this lymphadenopathy was part of her body's immune response to the vaccine  Now that it is resolving, recommend observation  If it begins to get larger again, I encouraged Bryce Klein to make an appointment at that time for re-evaluation  Roberto Turner has had pain in multiple areas of her body for years  Most recently, her arms and neck have been bothering her  She has been seeing a chiropractor, and she reports that it is helping her pain  She takes Tylenol occasionally for pain  Reviewed her previous labs; her Lyme, RF, and VANESSA were normal  Given the benign workup and physical exam and the chronicity of her symptoms, fibromyalgia seems the most likely diagnosis  Education provided during visit regarding fibromyalgia physiology and treatment options  Recommended exercise and to continue seeing the chiropractor since that is helping her pain  She may use Tylenol for pain as needed  Bryce Klein would like to avoid taking medication at this time since her symptoms are relatively well controlled  She is aware that there are options for medical treatment that we can talk about further in the future if her pain becomes uncontrolled  She expressed agreement and understanding of the plan  Diagnoses and all orders for this visit:    Lymphadenopathy of left cervical region    Fibromyalgia    Encounter for screening mammogram for malignant neoplasm of breast  -     Mammo screening bilateral w 3d & cad; Future    Screening for colorectal cancer  -     Ambulatory referral to General Surgery; Future          Subjective:      Patient ID: Ryan Ng is a 45 y o  female      Bryce Klein presents to the office today for evaluation of L sided cervical lump which showed up about a week ago and initially got bigger and was painful and has now decreased in size and is no longer painful  She is also complaining of bilateral arm pain, which has been ongoing for months  No other acute complaints today  The following portions of the patient's history were reviewed and updated as appropriate: allergies, current medications, past family history, past medical history, past social history, past surgical history and problem list     Review of Systems   Constitutional: Positive for fatigue  Negative for appetite change, chills and fever  HENT: Negative for congestion, ear pain, facial swelling, rhinorrhea, sinus pressure, sinus pain, sneezing, sore throat, trouble swallowing and voice change  Lump L supraclavicular/cervical region   Respiratory: Negative for cough and shortness of breath  Cardiovascular: Negative for chest pain  Musculoskeletal: Positive for arthralgias (arms b/l)  Negative for gait problem and joint swelling  Neurological: Negative for dizziness and headaches  Psychiatric/Behavioral: Positive for sleep disturbance (2/2 arm pain)  Objective:      /78 (BP Location: Left arm, Patient Position: Sitting, Cuff Size: Standard)   Pulse 76   Temp 97 5 °F (36 4 °C) (Temporal)   Resp 20   Ht 4' 10" (1 473 m)   Wt 53 8 kg (118 lb 9 6 oz)   SpO2 97%   BMI 24 79 kg/m²          Physical Exam  Vitals signs reviewed  Constitutional:       General: She is not in acute distress  Appearance: Normal appearance  She is not toxic-appearing  HENT:      Head: Normocephalic and atraumatic  Eyes:      Extraocular Movements: Extraocular movements intact  Conjunctiva/sclera: Conjunctivae normal    Neck:      Musculoskeletal: Normal range of motion and neck supple  No neck rigidity or muscular tenderness  Cardiovascular:      Rate and Rhythm: Normal rate and regular rhythm  Pulses: Normal pulses  Heart sounds: Normal heart sounds  No murmur  Pulmonary:      Effort: Pulmonary effort is normal  No respiratory distress  Breath sounds: Normal breath sounds  No stridor  No wheezing, rhonchi or rales  Musculoskeletal: Normal range of motion  Lymphadenopathy:      Cervical: Cervical adenopathy present  Skin:     General: Skin is warm and dry  Neurological:      Mental Status: She is alert and oriented to person, place, and time  Sensory: No sensory deficit  Motor: No weakness  Gait: Gait normal    Psychiatric:         Mood and Affect: Mood normal          Behavior: Behavior normal          Thought Content:  Thought content normal

## 2021-08-12 ENCOUNTER — TELEPHONE (OUTPATIENT)
Dept: FAMILY MEDICINE CLINIC | Facility: CLINIC | Age: 51
End: 2021-08-12

## 2021-08-12 DIAGNOSIS — Z12.12 SCREENING FOR COLORECTAL CANCER: Primary | ICD-10-CM

## 2021-08-12 DIAGNOSIS — Z12.11 SCREENING FOR COLORECTAL CANCER: Primary | ICD-10-CM

## 2021-08-29 ENCOUNTER — APPOINTMENT (EMERGENCY)
Dept: CT IMAGING | Facility: HOSPITAL | Age: 51
End: 2021-08-29
Payer: COMMERCIAL

## 2021-08-29 ENCOUNTER — APPOINTMENT (EMERGENCY)
Dept: RADIOLOGY | Facility: HOSPITAL | Age: 51
End: 2021-08-29
Payer: COMMERCIAL

## 2021-08-29 ENCOUNTER — HOSPITAL ENCOUNTER (EMERGENCY)
Facility: HOSPITAL | Age: 51
Discharge: HOME | End: 2021-08-29
Attending: EMERGENCY MEDICINE
Payer: COMMERCIAL

## 2021-08-29 VITALS
DIASTOLIC BLOOD PRESSURE: 105 MMHG | OXYGEN SATURATION: 99 % | WEIGHT: 110.45 LBS | BODY MASS INDEX: 23.08 KG/M2 | TEMPERATURE: 98.1 F | RESPIRATION RATE: 18 BRPM | SYSTOLIC BLOOD PRESSURE: 178 MMHG | HEART RATE: 102 BPM

## 2021-08-29 DIAGNOSIS — M54.2 NECK PAIN: ICD-10-CM
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
## 2021-08-29 DIAGNOSIS — S09.90XA INJURY OF HEAD, INITIAL ENCOUNTER: ICD-10-CM

## 2021-08-29 DIAGNOSIS — M25.511 ACUTE PAIN OF RIGHT SHOULDER: ICD-10-CM

## 2021-08-29 PROCEDURE — 72125 CT NECK SPINE W/O DYE: CPT

## 2021-08-29 PROCEDURE — 99284 EMERGENCY DEPT VISIT MOD MDM: CPT

## 2021-08-29 PROCEDURE — 99284 EMERGENCY DEPT VISIT MOD MDM: CPT | Performed by: EMERGENCY MEDICINE

## 2021-08-29 PROCEDURE — 70450 CT HEAD/BRAIN W/O DYE: CPT

## 2021-08-29 PROCEDURE — 73030 X-RAY EXAM OF SHOULDER: CPT

## 2021-08-29 RX ORDER — METHOCARBAMOL 750 MG/1
1500 TABLET, FILM COATED ORAL EVERY 8 HOURS SCHEDULED
Qty: 42 TABLET | Refills: 0 | Status: SHIPPED | OUTPATIENT
Start: 2021-08-29 | End: 2021-08-29 | Stop reason: SDUPTHER

## 2021-08-29 RX ORDER — TRAMADOL HYDROCHLORIDE 50 MG/1
50 TABLET ORAL ONCE
Status: COMPLETED | OUTPATIENT
Start: 2021-08-29 | End: 2021-08-29

## 2021-08-29 RX ORDER — ACETAMINOPHEN 500 MG
1000 TABLET ORAL EVERY 6 HOURS PRN
Qty: 60 TABLET | Refills: 0 | Status: SHIPPED | OUTPATIENT
Start: 2021-08-29

## 2021-08-29 RX ORDER — METHOCARBAMOL 750 MG/1
1500 TABLET, FILM COATED ORAL EVERY 8 HOURS SCHEDULED
Qty: 42 TABLET | Refills: 0 | Status: SHIPPED | OUTPATIENT
Start: 2021-08-29 | End: 2021-09-20

## 2021-08-29 RX ORDER — IBUPROFEN 600 MG/1
600 TABLET ORAL ONCE
Status: COMPLETED | OUTPATIENT
Start: 2021-08-29 | End: 2021-08-29

## 2021-08-29 RX ADMIN — TRAMADOL HYDROCHLORIDE 50 MG: 50 TABLET, FILM COATED ORAL at 22:20

## 2021-08-29 RX ADMIN — IBUPROFEN 600 MG: 600 TABLET, FILM COATED ORAL at 22:20
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
## 2021-09-20 ENCOUNTER — APPOINTMENT (OUTPATIENT)
Dept: LAB | Facility: HOSPITAL | Age: 51
End: 2021-09-20
Payer: COMMERCIAL

## 2021-09-20 ENCOUNTER — OFFICE VISIT (OUTPATIENT)
Dept: FAMILY MEDICINE CLINIC | Facility: CLINIC | Age: 51
End: 2021-09-20
Payer: COMMERCIAL

## 2021-09-20 VITALS
SYSTOLIC BLOOD PRESSURE: 120 MMHG | DIASTOLIC BLOOD PRESSURE: 72 MMHG | TEMPERATURE: 97.4 F | HEIGHT: 58 IN | HEART RATE: 106 BPM | WEIGHT: 110 LBS | BODY MASS INDEX: 23.09 KG/M2 | OXYGEN SATURATION: 98 % | RESPIRATION RATE: 16 BRPM

## 2021-09-20 DIAGNOSIS — Z11.59 ENCOUNTER FOR HEPATITIS C SCREENING TEST FOR LOW RISK PATIENT: ICD-10-CM

## 2021-09-20 DIAGNOSIS — M25.511 CHRONIC RIGHT SHOULDER PAIN: Primary | ICD-10-CM

## 2021-09-20 DIAGNOSIS — R10.13 EPIGASTRIC PAIN: ICD-10-CM

## 2021-09-20 DIAGNOSIS — G89.29 CHRONIC RIGHT SHOULDER PAIN: Primary | ICD-10-CM

## 2021-09-20 DIAGNOSIS — F41.9 ANXIETY: ICD-10-CM

## 2021-09-20 DIAGNOSIS — Z12.11 SCREENING FOR COLON CANCER: ICD-10-CM

## 2021-09-20 DIAGNOSIS — Z12.31 ENCOUNTER FOR SCREENING MAMMOGRAM FOR MALIGNANT NEOPLASM OF BREAST: ICD-10-CM

## 2021-09-20 LAB — HCV AB SER QL: NORMAL

## 2021-09-20 PROCEDURE — 86803 HEPATITIS C AB TEST: CPT

## 2021-09-20 PROCEDURE — 36415 COLL VENOUS BLD VENIPUNCTURE: CPT

## 2021-09-20 PROCEDURE — 99214 OFFICE O/P EST MOD 30 MIN: CPT | Performed by: NURSE PRACTITIONER

## 2021-09-20 RX ORDER — PANTOPRAZOLE SODIUM 40 MG/1
40 TABLET, DELAYED RELEASE ORAL
Qty: 90 TABLET | Refills: 3 | Status: SHIPPED | OUTPATIENT
Start: 2021-09-20

## 2021-09-20 RX ORDER — HYDROXYZINE HYDROCHLORIDE 10 MG/1
10 TABLET, FILM COATED ORAL
Qty: 30 TABLET | Refills: 0 | Status: SHIPPED | OUTPATIENT
Start: 2021-09-20

## 2021-09-20 NOTE — PROGRESS NOTES
BMI Counseling: Body mass index is 22 99 kg/m²  The BMI is above normal  Nutrition recommendations include encouraging healthy choices of fruits and vegetables, decreasing fast food intake, consuming healthier snacks, limiting drinks that contain sugar, increasing intake of lean protein, reducing intake of saturated and trans fat and reducing intake of cholesterol  Exercise recommendations include exercising 3-5 times per week  Patient referred to nutritionist  Rationale for BMI follow-up plan is due to patient being overweight or obese  BMI Counseling: Body mass index is 22 99 kg/m²  The BMI is below normal  Patient advised to gain weight  Rationale for BMI follow-up plan is due to patient being underweight  Depression Screening and Follow-up Plan:   Patient assessed for underlying major depression  Brief counseling provided and recommend additional follow-up/re-evaluation next office visit  Assessment/Plan:    Anxiety  -pt recently lost mother to cancer 2 weeks ago  States she does not wish to take anything for depression but something to help her sleep  She is very concerned about cancer in her family now that her mother had pancreatic cancer and will be following up GI  Chronic right shoulder pain  -pt shoulder xray shows arthritis  Pt has been suffering from right shoulder pain for years now  Uses diclofenac topical as oral NSAIDs affect her epigastric area  I am recommending tylenol and topical NSAID use  Referring to see ortho for shoulder pain  Referral provided  Epigastric pain  -Pt used omeprazole and ezomeprazole with s/e today we are giving pantoprazole and a f/u with GI  States she had a endoscopy 4 years ago and has GI ulcers with Dr Jessi Willoughby  She will be following up as her current family history of cancer in GI  Referral provided today  Diagnoses and all orders for this visit:    Chronic right shoulder pain  -     Ambulatory referral to Orthopedic Surgery;  Future  -     Diclofenac Sodium (VOLTAREN) 1 %; Apply 2 g topically 4 (four) times a day    Epigastric pain  -     pantoprazole (PROTONIX) 40 mg tablet; Take 1 tablet (40 mg total) by mouth daily before breakfast    Anxiety  -     hydrOXYzine HCL (ATARAX) 10 mg tablet; Take 1 tablet (10 mg total) by mouth daily at bedtime    Screening for colon cancer  -     Ambulatory referral to Gastroenterology; Future    Encounter for screening mammogram for malignant neoplasm of breast  -     Mammo screening bilateral w 3d & cad; Future    Encounter for hepatitis C screening test for low risk patient  -     Hepatitis C antibody; Future          Subjective:      Patient ID: Prudence Lake is a 45 y o  female  45year old female patient here for follow up on ER visit on 8/29  States she fell down 5 stairs and injured her right arm  Per scans were all normal  In the past she has had issues with her right shoulder  Was referred to see a specialist due to insurance issues  States she has right arm pain 5/10  Does not take anything for this pain per patient  Is using diclofenac to help somewhat  She gets acid feeling In her stomach  States she has epigastric bother that comes and goes per patient  Is not taking anything for epigastric pain  Was on a prazole per patient but does not remember which one  The following portions of the patient's history were reviewed and updated as appropriate: allergies, current medications, past family history, past medical history, past social history, past surgical history and problem list     Review of Systems   Constitutional: Negative  Negative for appetite change and fever  HENT: Negative  Eyes: Negative  Respiratory: Negative  Negative for cough, chest tightness and wheezing  Cardiovascular: Negative  Negative for chest pain and palpitations  Gastrointestinal: Positive for abdominal distention (epigastric)  Endocrine: Negative  Genitourinary: Negative      Musculoskeletal: Positive for arthralgias (right shoulder)  Skin: Negative  Allergic/Immunologic: Negative  Neurological: Negative  Negative for dizziness and headaches  Hematological: Negative  Psychiatric/Behavioral: The patient is nervous/anxious  Objective:      /72 (BP Location: Left arm, Patient Position: Sitting, Cuff Size: Standard)   Pulse (!) 106   Temp (!) 97 4 °F (36 3 °C) (Temporal)   Resp 16   Ht 4' 10" (1 473 m)   Wt 49 9 kg (110 lb)   SpO2 98%   BMI 22 99 kg/m²          Physical Exam  Vitals and nursing note reviewed  Constitutional:       General: She is not in acute distress  Appearance: Normal appearance  She is not ill-appearing  HENT:      Head: Normocephalic and atraumatic  Right Ear: External ear normal       Left Ear: External ear normal       Nose: Nose normal  No congestion or rhinorrhea  Mouth/Throat:      Pharynx: Oropharynx is clear  No oropharyngeal exudate  Eyes:      Extraocular Movements: Extraocular movements intact  Conjunctiva/sclera: Conjunctivae normal       Pupils: Pupils are equal, round, and reactive to light  Cardiovascular:      Rate and Rhythm: Normal rate and regular rhythm  Pulses: Normal pulses  Heart sounds: Normal heart sounds  Pulmonary:      Effort: Pulmonary effort is normal  No respiratory distress  Breath sounds: Normal breath sounds  Abdominal:      General: Bowel sounds are normal       Palpations: Abdomen is soft  Musculoskeletal:         General: Tenderness and signs of injury present  Right shoulder: Tenderness present  Decreased range of motion  Cervical back: Normal range of motion and neck supple  Skin:     General: Skin is warm and dry  Capillary Refill: Capillary refill takes less than 2 seconds  Neurological:      General: No focal deficit present  Mental Status: She is alert and oriented to person, place, and time     Psychiatric:         Mood and Affect: Mood normal  Behavior: Behavior normal            Chief Complaint   Patient presents with    Follow-up

## 2021-09-20 NOTE — ASSESSMENT & PLAN NOTE
-pt recently lost mother to cancer 2 weeks ago  States she does not wish to take anything for depression but something to help her sleep  She is very concerned about cancer in her family now that her mother had pancreatic cancer and will be following up GI

## 2021-09-20 NOTE — ASSESSMENT & PLAN NOTE
-pt shoulder xray shows arthritis  Pt has been suffering from right shoulder pain for years now  Uses diclofenac topical as oral NSAIDs affect her epigastric area  I am recommending tylenol and topical NSAID use  Referring to see ortho for shoulder pain  Referral provided

## 2021-09-20 NOTE — ASSESSMENT & PLAN NOTE
-Pt used omeprazole and ezomeprazole with s/e today we are giving pantoprazole and a f/u with GI  States she had a endoscopy 4 years ago and has GI ulcers with Dr Doug Couch  She will be following up as her current family history of cancer in GI  Referral provided today

## 2021-09-21 ENCOUNTER — TELEPHONE (OUTPATIENT)
Dept: FAMILY MEDICINE CLINIC | Facility: CLINIC | Age: 51
End: 2021-09-21

## 2021-09-21 NOTE — TELEPHONE ENCOUNTER
----- Message from Aziza Lopez, [address] Barrios sent at 9/21/2021 10:41 AM EDT -----  Please call patient and inform that lab results are negative for hep C

## 2021-10-25 ENCOUNTER — OFFICE VISIT (OUTPATIENT)
Dept: FAMILY MEDICINE CLINIC | Facility: CLINIC | Age: 51
End: 2021-10-25
Payer: COMMERCIAL

## 2021-10-25 VITALS
BODY MASS INDEX: 23.51 KG/M2 | TEMPERATURE: 97.8 F | HEIGHT: 58 IN | SYSTOLIC BLOOD PRESSURE: 122 MMHG | OXYGEN SATURATION: 99 % | RESPIRATION RATE: 18 BRPM | HEART RATE: 78 BPM | WEIGHT: 112 LBS | DIASTOLIC BLOOD PRESSURE: 72 MMHG

## 2021-10-25 DIAGNOSIS — Z23 NEEDS FLU SHOT: ICD-10-CM

## 2021-10-25 DIAGNOSIS — G89.29 CHRONIC RIGHT SHOULDER PAIN: Primary | ICD-10-CM

## 2021-10-25 DIAGNOSIS — M25.511 CHRONIC RIGHT SHOULDER PAIN: Primary | ICD-10-CM

## 2021-10-25 DIAGNOSIS — Z12.11 SCREENING FOR COLON CANCER: ICD-10-CM

## 2021-10-25 PROCEDURE — 90682 RIV4 VACC RECOMBINANT DNA IM: CPT

## 2021-10-25 PROCEDURE — 90471 IMMUNIZATION ADMIN: CPT

## 2021-10-25 PROCEDURE — 99214 OFFICE O/P EST MOD 30 MIN: CPT | Performed by: NURSE PRACTITIONER

## 2021-10-25 RX ORDER — DICLOFENAC SODIUM 75 MG/1
75 TABLET, DELAYED RELEASE ORAL 2 TIMES DAILY
Qty: 60 TABLET | Refills: 0 | Status: SHIPPED | OUTPATIENT
Start: 2021-10-25

## 2021-11-01 ENCOUNTER — CONSULT (OUTPATIENT)
Dept: OBGYN CLINIC | Facility: MEDICAL CENTER | Age: 51
End: 2021-11-01
Payer: COMMERCIAL

## 2021-11-01 VITALS
WEIGHT: 108 LBS | BODY MASS INDEX: 22.67 KG/M2 | SYSTOLIC BLOOD PRESSURE: 110 MMHG | DIASTOLIC BLOOD PRESSURE: 78 MMHG | HEIGHT: 58 IN

## 2021-11-01 DIAGNOSIS — M24.811 INTERNAL DERANGEMENT OF RIGHT SHOULDER: Primary | ICD-10-CM

## 2021-11-01 DIAGNOSIS — G89.29 CHRONIC RIGHT SHOULDER PAIN: ICD-10-CM

## 2021-11-01 DIAGNOSIS — M25.511 CHRONIC RIGHT SHOULDER PAIN: ICD-10-CM

## 2021-11-01 PROCEDURE — 99243 OFF/OP CNSLTJ NEW/EST LOW 30: CPT | Performed by: ORTHOPAEDIC SURGERY

## 2021-11-04 ENCOUNTER — PREP FOR PROCEDURE (OUTPATIENT)
Dept: SURGERY | Facility: CLINIC | Age: 51
End: 2021-11-04

## 2021-11-04 ENCOUNTER — CONSULT (OUTPATIENT)
Dept: SURGERY | Facility: CLINIC | Age: 51
End: 2021-11-04
Payer: COMMERCIAL

## 2021-11-04 ENCOUNTER — HOSPITAL ENCOUNTER (OUTPATIENT)
Dept: RADIOLOGY | Facility: HOSPITAL | Age: 51
Discharge: HOME | End: 2021-11-04
Payer: COMMERCIAL

## 2021-11-04 VITALS
TEMPERATURE: 97.3 F | WEIGHT: 108 LBS | HEIGHT: 58 IN | SYSTOLIC BLOOD PRESSURE: 120 MMHG | BODY MASS INDEX: 22.67 KG/M2 | HEART RATE: 76 BPM | DIASTOLIC BLOOD PRESSURE: 70 MMHG

## 2021-11-04 DIAGNOSIS — Z12.11 ENCOUNTER FOR SCREENING COLONOSCOPY: Primary | ICD-10-CM

## 2021-11-04 DIAGNOSIS — Z12.11 SCREENING FOR COLON CANCER: Primary | ICD-10-CM

## 2021-11-04 DIAGNOSIS — G89.29 CHRONIC RIGHT SHOULDER PAIN: ICD-10-CM

## 2021-11-04 DIAGNOSIS — M25.511 CHRONIC RIGHT SHOULDER PAIN: ICD-10-CM

## 2021-11-04 PROCEDURE — 99244 OFF/OP CNSLTJ NEW/EST MOD 40: CPT | Performed by: SURGERY

## 2021-11-04 PROCEDURE — G1004 CDSM NDSC: HCPCS

## 2021-11-04 PROCEDURE — 73221 MRI JOINT UPR EXTREM W/O DYE: CPT

## 2021-11-04 RX ORDER — POLYETHYLENE GLYCOL 3350 17 G/17G
238 POWDER, FOR SOLUTION ORAL SEE ADMIN INSTRUCTIONS
Qty: 238 G | Refills: 0 | Status: SHIPPED | OUTPATIENT
Start: 2021-11-04 | End: 2021-12-03 | Stop reason: HOSPADM

## 2021-11-04 RX ORDER — DIPHENOXYLATE HYDROCHLORIDE AND ATROPINE SULFATE 2.5; .025 MG/1; MG/1
1 TABLET ORAL DAILY
COMMUNITY

## 2021-11-11 ENCOUNTER — TELEPHONE (OUTPATIENT)
Dept: PREADMISSION TESTING | Facility: HOSPITAL | Age: 51
End: 2021-11-11

## 2021-11-11 ENCOUNTER — OFFICE VISIT (OUTPATIENT)
Dept: OBGYN CLINIC | Facility: MEDICAL CENTER | Age: 51
End: 2021-11-11
Payer: COMMERCIAL

## 2021-11-11 VITALS
DIASTOLIC BLOOD PRESSURE: 73 MMHG | SYSTOLIC BLOOD PRESSURE: 105 MMHG | HEIGHT: 58 IN | BODY MASS INDEX: 23.09 KG/M2 | HEART RATE: 73 BPM | WEIGHT: 110 LBS

## 2021-11-11 DIAGNOSIS — S46.011A TRAUMATIC INCOMPLETE TEAR OF RIGHT ROTATOR CUFF, INITIAL ENCOUNTER: Primary | ICD-10-CM

## 2021-11-11 PROCEDURE — 20610 DRAIN/INJ JOINT/BURSA W/O US: CPT | Performed by: ORTHOPAEDIC SURGERY

## 2021-11-11 PROCEDURE — 99214 OFFICE O/P EST MOD 30 MIN: CPT | Performed by: ORTHOPAEDIC SURGERY

## 2021-11-11 RX ORDER — TRIAMCINOLONE ACETONIDE 40 MG/ML
40 INJECTION, SUSPENSION INTRA-ARTICULAR; INTRAMUSCULAR
Status: COMPLETED | OUTPATIENT
Start: 2021-11-11 | End: 2021-11-11

## 2021-11-11 RX ORDER — LIDOCAINE HYDROCHLORIDE 10 MG/ML
3 INJECTION, SOLUTION INFILTRATION; PERINEURAL
Status: COMPLETED | OUTPATIENT
Start: 2021-11-11 | End: 2021-11-11

## 2021-11-11 RX ADMIN — LIDOCAINE HYDROCHLORIDE 3 ML: 10 INJECTION, SOLUTION INFILTRATION; PERINEURAL at 11:33

## 2021-11-11 RX ADMIN — TRIAMCINOLONE ACETONIDE 40 MG: 40 INJECTION, SUSPENSION INTRA-ARTICULAR; INTRAMUSCULAR at 11:33

## 2021-12-02 ENCOUNTER — TELEPHONE (OUTPATIENT)
Dept: GASTROENTEROLOGY | Facility: HOSPITAL | Age: 51
End: 2021-12-02

## 2021-12-03 ENCOUNTER — ANESTHESIA (OUTPATIENT)
Dept: GASTROENTEROLOGY | Facility: HOSPITAL | Age: 51
End: 2021-12-03

## 2021-12-03 ENCOUNTER — HOSPITAL ENCOUNTER (OUTPATIENT)
Dept: GASTROENTEROLOGY | Facility: HOSPITAL | Age: 51
Setting detail: OUTPATIENT SURGERY
Discharge: HOME | End: 2021-12-03
Attending: SURGERY
Payer: COMMERCIAL

## 2021-12-03 ENCOUNTER — ANESTHESIA EVENT (OUTPATIENT)
Dept: GASTROENTEROLOGY | Facility: HOSPITAL | Age: 51
End: 2021-12-03

## 2021-12-03 VITALS
RESPIRATION RATE: 16 BRPM | DIASTOLIC BLOOD PRESSURE: 71 MMHG | HEART RATE: 77 BPM | HEIGHT: 58 IN | BODY MASS INDEX: 23.09 KG/M2 | OXYGEN SATURATION: 96 % | WEIGHT: 110 LBS | SYSTOLIC BLOOD PRESSURE: 134 MMHG | TEMPERATURE: 96.7 F

## 2021-12-03 DIAGNOSIS — Z12.11 ENCOUNTER FOR SCREENING COLONOSCOPY: ICD-10-CM

## 2021-12-03 PROCEDURE — G0121 COLON CA SCRN NOT HI RSK IND: HCPCS | Performed by: SURGERY

## 2021-12-03 RX ORDER — LIDOCAINE HYDROCHLORIDE 10 MG/ML
INJECTION, SOLUTION EPIDURAL; INFILTRATION; INTRACAUDAL; PERINEURAL AS NEEDED
Status: DISCONTINUED | OUTPATIENT
Start: 2021-12-03 | End: 2021-12-03

## 2021-12-03 RX ORDER — SODIUM CHLORIDE 9 MG/ML
125 INJECTION, SOLUTION INTRAVENOUS CONTINUOUS
Status: DISCONTINUED | OUTPATIENT
Start: 2021-12-03 | End: 2021-12-07 | Stop reason: HOSPADM

## 2021-12-03 RX ORDER — SODIUM CHLORIDE 9 MG/ML
75 INJECTION, SOLUTION INTRAVENOUS CONTINUOUS
Status: CANCELLED | OUTPATIENT
Start: 2021-12-03

## 2021-12-03 RX ORDER — PROPOFOL 10 MG/ML
INJECTION, EMULSION INTRAVENOUS CONTINUOUS PRN
Status: DISCONTINUED | OUTPATIENT
Start: 2021-12-03 | End: 2021-12-03

## 2021-12-03 RX ORDER — PROPOFOL 10 MG/ML
INJECTION, EMULSION INTRAVENOUS AS NEEDED
Status: DISCONTINUED | OUTPATIENT
Start: 2021-12-03 | End: 2021-12-03

## 2021-12-03 RX ORDER — SODIUM CHLORIDE 9 MG/ML
INJECTION, SOLUTION INTRAVENOUS CONTINUOUS PRN
Status: DISCONTINUED | OUTPATIENT
Start: 2021-12-03 | End: 2021-12-03

## 2021-12-03 RX ADMIN — PROPOFOL 50 MCG/KG/MIN: 10 INJECTION, EMULSION INTRAVENOUS at 12:43

## 2021-12-03 RX ADMIN — SODIUM CHLORIDE: 0.9 INJECTION, SOLUTION INTRAVENOUS at 12:15

## 2021-12-03 RX ADMIN — PROPOFOL 100 MG: 10 INJECTION, EMULSION INTRAVENOUS at 12:38

## 2021-12-03 RX ADMIN — LIDOCAINE HYDROCHLORIDE 50 MG: 10 INJECTION, SOLUTION EPIDURAL; INFILTRATION; INTRACAUDAL; PERINEURAL at 12:38

## 2021-12-03 RX ADMIN — SODIUM CHLORIDE 125 ML/HR: 0.9 INJECTION, SOLUTION INTRAVENOUS at 12:13

## 2021-12-07 ENCOUNTER — TELEPHONE (OUTPATIENT)
Dept: SURGERY | Facility: CLINIC | Age: 51
End: 2021-12-07

## 2021-12-30 ENCOUNTER — OFFICE VISIT (OUTPATIENT)
Dept: OBGYN CLINIC | Facility: MEDICAL CENTER | Age: 51
End: 2021-12-30
Payer: COMMERCIAL

## 2021-12-30 VITALS
HEIGHT: 58 IN | SYSTOLIC BLOOD PRESSURE: 104 MMHG | DIASTOLIC BLOOD PRESSURE: 73 MMHG | WEIGHT: 107.4 LBS | BODY MASS INDEX: 22.55 KG/M2

## 2021-12-30 DIAGNOSIS — S46.011A TRAUMATIC INCOMPLETE TEAR OF RIGHT ROTATOR CUFF, INITIAL ENCOUNTER: Primary | ICD-10-CM

## 2021-12-30 PROCEDURE — 99213 OFFICE O/P EST LOW 20 MIN: CPT | Performed by: ORTHOPAEDIC SURGERY

## 2022-01-10 ENCOUNTER — OFFICE VISIT (OUTPATIENT)
Dept: URGENT CARE | Age: 52
End: 2022-01-10
Payer: COMMERCIAL

## 2022-01-10 VITALS
WEIGHT: 107 LBS | TEMPERATURE: 98.2 F | RESPIRATION RATE: 18 BRPM | HEART RATE: 97 BPM | OXYGEN SATURATION: 99 % | BODY MASS INDEX: 22.36 KG/M2

## 2022-01-10 DIAGNOSIS — B34.9 VIRAL ILLNESS: Primary | ICD-10-CM

## 2022-01-10 PROCEDURE — U0003 INFECTIOUS AGENT DETECTION BY NUCLEIC ACID (DNA OR RNA); SEVERE ACUTE RESPIRATORY SYNDROME CORONAVIRUS 2 (SARS-COV-2) (CORONAVIRUS DISEASE [COVID-19]), AMPLIFIED PROBE TECHNIQUE, MAKING USE OF HIGH THROUGHPUT TECHNOLOGIES AS DESCRIBED BY CMS-2020-01-R: HCPCS

## 2022-01-10 PROCEDURE — U0005 INFEC AGEN DETEC AMPLI PROBE: HCPCS

## 2022-01-10 PROCEDURE — 99213 OFFICE O/P EST LOW 20 MIN: CPT

## 2022-01-10 NOTE — LETTER
January 10, 2022     Patient: Frankie Petersen   YOB: 1970   Date of Visit: 1/10/2022       To Whom it May Concern:    Frankie Petersen was seen in my clinic on 1/10/2022  She may return to work on 1/14/2022  If you have any questions or concerns, please don't hesitate to call           Sincerely,          ROJELIO Cordoba        CC: No Recipients

## 2022-01-11 LAB — SARS-COV-2 RNA RESP QL NAA+PROBE: NEGATIVE

## 2022-01-11 NOTE — PATIENT INSTRUCTIONS
You have been swabbed for COVID/INFLUENZA today  You can expect your results in 24-48 hours  Results will be available on [employer]  I will call you with results also  Please isolate from others until your receive your results  If your results are positive for COVID regardless of vaccination status, you are to isolate for 5 days from symptom onset then continue to wear a mask around others for another 5 days  If you have a fever, continue to stay home until your fever resolves  If you were exposed to someone with COVID and have been boosted or completed the primary series of Pfizer or Moderna vaccine within the last 6 months, or completed the primary series of J&J vaccine within the last 2 months - you are to wear a mask around others for 10 days and test on day 5 if possible  If you completed the primary series of Pfizer or Moderna vaccine over 6 months ago and are not boosted or completed the primary series of J&J over 2 months ago and are not boosted or are not vaccinated - you are to stay home for 5 days then continue to wear a mask around others for another 5 days  If you are unable to quarantine, you must wear a mask for 10 days  If you test positive for the flu, you are to stay home until you are fever free for 24 hours  Tylenol and ibuprofen as needed for pain and/or fever  Mucinex DM for cough and congestion  Vitamin C 1000 mg, Vitamin D3 2000 units, and Zinc 100 mg help to boost your immunity to fight viruses  Hydrate - water and sports drinks are great for hydration  Rest   Follow up with your PCP  Go to ED for worsening symptoms  Viral Syndrome   WHAT YOU NEED TO KNOW:   Viral syndrome is a term used for symptoms of an infection caused by a virus  Viruses are spread easily from person to person through the air and on shared items  DISCHARGE INSTRUCTIONS:   Call your local emergency number (911 in the [address] [place] [address],[address]) or have someone else call if:   · You have a seizure      · You cannot be woken     · You have chest pain or trouble breathing  Return to the emergency department if:   · You have a stiff neck, a bad headache, and sensitivity to light  · You feel weak, dizzy, or confused  · You stop urinating or urinate a lot less than usual     · You cough up blood or thick yellow or green mucus  · You have severe abdominal pain or your abdomen is larger than usual     Call your doctor if:   · Your symptoms do not get better with treatment or get worse after 3 days  · You have a rash or ear pain  · You have burning when you urinate  · You have questions or concerns about your condition or care  Medicines: You may  need any of the following:  · Acetaminophen  decreases pain and fever  It is available without a doctor's order  Ask how much to take and how often to take it  Follow directions  Read the labels of all other medicines you are using to see if they also contain acetaminophen, or ask your doctor or pharmacist  Acetaminophen can cause liver damage if not taken correctly  Do not use more than 4 grams (4,000 milligrams) total of acetaminophen in one day  · NSAIDs , such as ibuprofen, help decrease swelling, pain, and fever  NSAIDs can cause stomach bleeding or kidney problems in certain people  If you take blood thinner medicine, always ask your healthcare provider if NSAIDs are safe for you  Always read the medicine label and follow directions  · Cold medicine  helps decrease swelling, control a cough, and relieve chest or nasal congestion  · Saline nasal spray  helps decrease nasal congestion  · Take your medicine as directed  Contact your healthcare provider if you think your medicine is not helping or if you have side effects  Tell him of her if you are allergic to any medicine  Keep a list of the medicines, vitamins, and herbs you take  Include the amounts, and when and why you take them  Bring the list or the pill bottles to follow-up visits   Carry your medicine list with you in case of an emergency  Manage your symptoms:   · Drink liquids as directed to prevent dehydration  Ask how much liquid to drink each day and which liquids are best for you  Ask if you should drink an oral rehydration solution (ORS)  An ORS has the right amounts of water, salts, and sugar you need to replace body fluids  This may help prevent dehydration caused by vomiting or diarrhea  Do not drink liquids with caffeine  Liquids with caffeine can make dehydration worse  · Get plenty of rest to help your body heal   Take naps throughout the day  Ask your healthcare provider when you can return to work and your normal activities  · Use a cool mist humidifier to help you breathe easier  Ask your healthcare provider how to use a cool mist humidifier  · Eat honey or use cough drops for a sore throat  Cough drops are available without a doctor's order  Follow directions for taking cough drops  · Do not smoke or be close to anyone who is smoking  Nicotine and other chemicals in cigarettes and cigars can cause lung damage  Smoking can also delay healing  Ask your healthcare provider for information if you currently smoke and need help to quit  E-cigarettes or smokeless tobacco still contain nicotine  Talk to your healthcare provider before you use these products  Prevent the spread of germs:       · Wash your hands often  Wash your hands several times each day  Wash after you use the bathroom, change a child's diaper, and before you prepare or eat food  Use soap and water every time  Rub your soapy hands together, lacing your fingers  Wash the front and back of your hands, and in between your fingers  Use the fingers of one hand to scrub under the fingernails of the other hand  Wash for at least 20 seconds  Rinse with warm, running water for several seconds  Then dry your hands with a clean towel or paper towel   Use hand  that contains alcohol if soap and water are not available  Do not touch your eyes, nose, or mouth without washing your hands first          · Cover a sneeze or cough  Use a tissue that covers your mouth and nose  Throw the tissue away in a trash can right away  Use the bend of your arm if a tissue is not available  Wash your hands well with soap and water or use a hand   · Stay away from others while you are sick  Avoid crowds as much as possible  · Ask about vaccines you may need  Talk to your healthcare provider about your vaccine history  He or she will tell you which vaccines you need, and when to get them  ? Get the influenza (flu) vaccine as soon as recommended each year  The flu vaccine is available starting in September or October  Flu viruses change, so it is important to get a flu vaccine every year  ? Get the pneumonia vaccine if recommended  This vaccine is usually recommended every 5 years  Your provider will tell you when to get this vaccine, if needed  Follow up with your doctor as directed:  Write down your questions so you remember to ask them during your visits  © Copyright [employer] 2021 Information is for End User's use only and may not be sold, redistributed or otherwise used for commercial purposes  All illustrations and images included in CareNotes® are the copyrighted property of A D A M , Inc  or [facility]   The above information is an  only  It is not intended as medical advice for individual conditions or treatments  Talk to your doctor, nurse or pharmacist before following any medical regimen to see if it is safe and effective for you

## 2022-01-11 NOTE — PROGRESS NOTES
[facility]        NAME: Daren Del Cid is a 45 y o  female  : 1970    MRN: [id number]  DATE: January 10, 2022  TIME: 7:42 PM    Assessment and Plan   Viral illness [B34 9]  1  Viral illness  COVID Only -Office Collect         Patient Instructions     You have been swabbed for COVID/INFLUENZA today  You can expect your results in 24-48 hours  Results will be available on [employer]  I will call you with results also  Please isolate from others until your receive your results  If your results are positive for COVID regardless of vaccination status, you are to isolate for 5 days from symptom onset then continue to wear a mask around others for another 5 days  If you have a fever, continue to stay home until your fever resolves  If you were exposed to someone with COVID and have been boosted or completed the primary series of Pfizer or Moderna vaccine within the last 6 months, or completed the primary series of J&J vaccine within the last 2 months - you are to wear a mask around others for 10 days and test on day 5 if possible  If you completed the primary series of Pfizer or Moderna vaccine over 6 months ago and are not boosted or completed the primary series of J&J over 2 months ago and are not boosted or are not vaccinated - you are to stay home for 5 days then continue to wear a mask around others for another 5 days  If you are unable to quarantine, you must wear a mask for 10 days  If you test positive for the flu, you are to stay home until you are fever free for 24 hours  Tylenol and ibuprofen as needed for pain and/or fever  Mucinex DM for cough and congestion  Vitamin C 1000 mg, Vitamin D3 2000 units, and Zinc 100 mg help to boost your immunity to fight viruses  Hydrate - water and sports drinks are great for hydration  Rest   Follow up with your PCP  Go to ED for worsening symptoms        Chief Complaint     Chief Complaint   Patient presents with   31 60 98     pt reports headache, chills, body aches, nausea that started last night         History of Present Illness       Patient reports headache, body aches, cough, nausea, and fever that started yesterday  Patient reports taking tylenol with minimal improvement  She reports being around her sick grandchildren  Review of Systems   Review of Systems   Constitutional: Positive for chills  HENT: Negative for congestion and rhinorrhea  Respiratory: Negative for cough and shortness of breath  Cardiovascular: Negative for chest pain  Gastrointestinal: Positive for nausea  Negative for diarrhea and vomiting  Musculoskeletal: Positive for myalgias  Neurological: Positive for headaches  All other systems reviewed and are negative          Current Medications       Current Outpatient Medications:     ascorbic acid (VITAMIN C) 1000 MG tablet, Take 1,000 mg by mouth daily, Disp: , Rfl:     Cholecalciferol 10 MCG (400 UNIT) CAPS, Take by mouth, Disp: , Rfl:     cyanocobalamin (VITAMIN B-12) 50 MCG tablet, Take 50 mcg by mouth daily, Disp: , Rfl:     multivitamin (THERAGRAN) TABS, Take 1 tablet by mouth daily, Disp: , Rfl:     acetaminophen (TYLENOL) 500 mg tablet, take 1 tablet by mouth every 6 hours if needed for pain (Patient not taking: Reported on 11/4/2021), Disp: , Rfl:     acetaminophen (TYLENOL) 500 mg tablet, Take 2 tablets (1,000 mg total) by mouth every 6 (six) hours as needed for mild pain (Patient not taking: Reported on 1/10/2022 ), Disp: 60 tablet, Rfl: 0    diclofenac (VOLTAREN) 75 mg EC tablet, Take 1 tablet (75 mg total) by mouth 2 (two) times a day (Patient not taking: Reported on 1/10/2022 ), Disp: 60 tablet, Rfl: 0    Diclofenac Sodium (VOLTAREN) 1 %, Apply 2 g topically 4 (four) times a day (Patient not taking: Reported on 11/4/2021), Disp: 50 g, Rfl: 0    Diclofenac Sodium 1 5 % SOLN, APPLY UP TO 40 DROPS TO THE AFFECTED AREAS 3-4 TIMES DAILY AS NEEDED FOR PAIN (Patient not taking: Reported on 1/10/2022), Disp: , Rfl:     hydrOXYzine HCL (ATARAX) 10 mg tablet, Take 1 tablet (10 mg total) by mouth daily at bedtime (Patient not taking: Reported on 11/4/2021), Disp: 30 tablet, Rfl: 0    lidocaine (XYLOCAINE) 5 % ointment, Apply 2-3 grams to the affected area 3-4 times daily (Patient not taking: Reported on 1/10/2022), Disp: , Rfl:     pantoprazole (PROTONIX) 40 mg tablet, Take 1 tablet (40 mg total) by mouth daily before breakfast (Patient not taking: Reported on 11/4/2021), Disp: 90 tablet, Rfl: 3    Current Allergies     Allergies as of 01/10/2022 - Reviewed 01/10/2022   Allergen Reaction Noted    Esomeprazole Diarrhea and GI Intolerance 03/01/2016    Omeprazole GI Intolerance 12/09/2015            The following portions of the patient's history were reviewed and updated as appropriate: allergies, current medications, past family history, past medical history, past social history, past surgical history and problem list      Past Medical History:   Diagnosis Date    Anemia     Anxiety     History of transfusion 2017    due to vag bleeding    Insomnia     Kidney stone     Mass of breast, left     Mass of breast, right     Seasonal allergies        Past Surgical History:   Procedure Laterality Date    COLONOSCOPY      HYSTERECTOMY      HYSTEROSCOPY      KIDNEY STONE SURGERY      LAPAROSCOPIC CHOLECYSTECTOMY      ID CORRJ HALLUX VALGUS W/SESMDC W/DIST METAR OSTEOT Right 9/1/2020    Procedure: Kari Moore;  Surgeon: Ming Arshad DPM;  Location: [address] [place] [address];  Service: Podiatry    TUBAL LIGATION         Family History   Problem Relation Age of Onset    Hypertension Mother     Heart disease Father     Breast cancer Maternal Aunt     Pancreatic cancer Maternal Uncle     Stomach cancer Paternal Uncle          Medications have been verified          Objective   Pulse 97   Temp 98 2 °F (36 8 °C)   Resp 18   Wt 48 5 kg (107 lb)   SpO2 99%   BMI 22 36 kg/m² Physical Exam     Physical Exam  Vitals and nursing note reviewed  Constitutional:       General: She is not in acute distress  Appearance: Normal appearance  She is not toxic-appearing  HENT:      Head: Normocephalic and atraumatic  Right Ear: External ear normal       Left Ear: External ear normal       Nose: Congestion present  No rhinorrhea  Mouth/Throat:      Mouth: Mucous membranes are moist       Pharynx: Oropharynx is clear  No oropharyngeal exudate or posterior oropharyngeal erythema  Eyes:      General: No scleral icterus  Right eye: No discharge  Left eye: No discharge  Conjunctiva/sclera: Conjunctivae normal    Cardiovascular:      Rate and Rhythm: Normal rate  Pulmonary:      Effort: Pulmonary effort is normal    Abdominal:      Tenderness: There is no abdominal tenderness  Musculoskeletal:         General: Normal range of motion  Cervical back: Normal range of motion  Skin:     General: Skin is warm and dry  Neurological:      General: No focal deficit present  Mental Status: She is alert and oriented to person, place, and time     Psychiatric:         Mood and Affect: Mood normal          Behavior: Behavior normal

## 2022-02-24 ENCOUNTER — OFFICE VISIT (OUTPATIENT)
Dept: OBGYN CLINIC | Facility: MEDICAL CENTER | Age: 52
End: 2022-02-24
Payer: COMMERCIAL

## 2022-02-24 VITALS
SYSTOLIC BLOOD PRESSURE: 116 MMHG | DIASTOLIC BLOOD PRESSURE: 75 MMHG | HEIGHT: 58 IN | BODY MASS INDEX: 22.92 KG/M2 | WEIGHT: 109.2 LBS

## 2022-02-24 DIAGNOSIS — S46.011A TRAUMATIC INCOMPLETE TEAR OF RIGHT ROTATOR CUFF, INITIAL ENCOUNTER: ICD-10-CM

## 2022-02-24 DIAGNOSIS — M25.511 CHRONIC RIGHT SHOULDER PAIN: Primary | ICD-10-CM

## 2022-02-24 DIAGNOSIS — G89.29 CHRONIC RIGHT SHOULDER PAIN: Primary | ICD-10-CM

## 2022-02-24 PROCEDURE — 99213 OFFICE O/P EST LOW 20 MIN: CPT | Performed by: ORTHOPAEDIC SURGERY

## 2022-02-24 NOTE — PROGRESS NOTES
Ortho Sports Medicine Shoulder Visit     Assesment:   45 y o  female right shoulder rotator cuff tear, 50% better    Plan:    The patient is 50% better following steroid injection of 11/2021  She is encouraged to continue her HEP learned from physical therapy  Surgery can be considered if the patients symptoms increase over time  The patient can follow up as needed and is welcome to return at any point with any new or old issue  Conservative treatment:    Ice to shoulder 1-2 times daily, for 20 minutes at a time  Imaging: All imaging from today was reviewed by myself and explained to the patient  Injection:    No Injection planned at this time  Surgery:     No surgery is recommended at this point, continue with conservative treatment plan as noted  History of Present Illness: The patient is returns for follow up of right shoulder  She is 50% better over all  Today she complains right anterior shoulder and upper arm pain  She continues her HEP with benefit  She has found good benefit from past steroid injection  She denies past right shoulder surgery  She has recently started a new job and therefore is avoiding surgery  I have reviewed the past medical, surgical, social and family history, medications and allergies as documented in the EMR  Review of systems: ROS is negative other than that noted in the HPI  Constitutional: Negative for fatigue and fever     Cardiovascular: Negative for chest pain  Pulmonary: negative for shortness of breath    PMH/PSH:  Past Medical History:   Diagnosis Date    Anemia     Anxiety     History of transfusion 2017    due to vag bleeding    Insomnia     Kidney stone     Mass of breast, left     Mass of breast, right     Seasonal allergies      Past Surgical History:   Procedure Laterality Date    COLONOSCOPY      HYSTERECTOMY      HYSTEROSCOPY      KIDNEY STONE SURGERY      LAPAROSCOPIC CHOLECYSTECTOMY      MS KD HALLUX VALGUS W/SESMDC W/DIST METAR OSTEOT Right 9/1/2020    Procedure: Kari Moore;  Surgeon: Ming Arshad DPM;  Location:  MAIN OR;  Service: Podiatry    TUBAL LIGATION          Physical Exam:    Blood pressure 116/75, height 4' 10" (1 473 m), weight 49 5 kg (109 lb 3 2 oz), not currently breastfeeding  General/Constitutional: NAD, well developed, well nourished  HENT: Normocephalic, atraumatic  CV: Intact distal pulses, regular rate  Resp: No respiratory distress or labored breathing  Lymphatic: No lymphadenopathy palpated  Neuro: Alert and Oriented x 3, no focal deficits  Psych: Normal mood, normal affect, normal judgement, normal behavior  Skin: Warm, dry, no rashes, no erythema     Shoulder Exam (focused):     Shoulder focused exam:       RIGHT LEFT    Scapula Atrophy Negative Negative     Winging Negative Negative     Protraction Negative Negative    Rotator cuff SS 5/5/5 5/5/5     IS 5/5/5 5/5/5     SubS 5/5/5 5/5/5    ROM  170     ER0 60 60     ER90 90 90     IR90 40 40     IRb T6 T6    TTP: AC Negative Negative     Biceps Positive  Negative     Coracoid Negative Negative    Special Tests: O'Briens Negative Negative     Butt-shear Negative Negative     Cross body Adduction Negative Negative     Speeds  Negative Negative     Daren's Negative Negative     Whipple Positive  Negative       Neer Negative Negative     Greene Negative Negative    Instability: Apprehension & relocation not tested not tested     Load & shift not tested not tested    Other: Crank Negative Negative               UE NV Exam: +2 Radial pulses bilaterally  Sensation intact to light touch C5-T1 bilaterally, Radial/median/ulnar nerve motor intact    Cervical ROM is full without pain, numbness or tingling      Shoulder Imaging    None performed today     Scribe Attestation    I,:  Joanie Finley am acting as a scribe while in the presence of the attending physician :       I,:  David Omalley DO personally performed the services described in this documentation    as scribed in my presence :

## 2022-10-05 ENCOUNTER — TELEPHONE (OUTPATIENT)
Dept: ADMINISTRATIVE | Facility: OTHER | Age: 52
End: 2022-10-05

## 2022-10-05 NOTE — TELEPHONE ENCOUNTER
----- Message from Jordan Carranza RN sent at 10/4/2022 10:15 AM EDT -----  Regarding: mammo  10/04/22 10:15 AM    Hello, our patient Javid Epperson has had Mammogram completed/performed  Please assist in updating the patient chart by pulling the Care Everywhere (CE) document  The date of service is 9/2021       Thank you,  Jordan Carranza   [address]

## 2022-10-05 NOTE — TELEPHONE ENCOUNTER
Upon review of the In Basket request we were able to locate, review, and update the patient chart as requested for Mammogram     Any additional questions or concerns should be emailed to the Practice Liaisons via [email or web address] email, please do not reply via In Basket      Thank you  Sixto Ochoa

## 2022-10-12 PROBLEM — Z12.11 SCREENING FOR COLON CANCER: Status: RESOLVED | Noted: 2021-09-20 | Resolved: 2022-10-12

## 2022-11-21 ENCOUNTER — OFFICE VISIT (OUTPATIENT)
Dept: URGENT CARE | Age: 52
End: 2022-11-21

## 2022-11-21 VITALS
OXYGEN SATURATION: 97 % | WEIGHT: 105.4 LBS | SYSTOLIC BLOOD PRESSURE: 90 MMHG | TEMPERATURE: 99.5 F | BODY MASS INDEX: 22.03 KG/M2 | DIASTOLIC BLOOD PRESSURE: 62 MMHG | HEART RATE: 89 BPM

## 2022-11-21 DIAGNOSIS — R10.13 EPIGASTRIC PAIN: Primary | ICD-10-CM

## 2022-11-21 NOTE — PROGRESS NOTES
[facility]        NAME: Rakesh Almaguer is a 47 y o  female  : 1970    MRN: [id number]  DATE: 2022  TIME: 6:53 PM    Assessment and Plan   Epigastric pain [R10 13]  1  Epigastric pain  Transfer to other facility      pt presents with complaint of epigastric pain  She is exquisitely tender to bilateral upper quadrants and epigastric area  She has slight jaundice of the skin as well  Further evaluation required to rule out intraabdominal pathology  I have recommended report to the emergency department for further evaluation  Pt has requested referral to [facility]  Referral placed at patient request       Patient Instructions   Patient Instructions   Report to [facility] ED for further evaluation  Chief Complaint     Chief Complaint   Patient presents with   • Abdominal Pain         History of Present Illness       47year old female presents with complaint of epigastric pain, weakness, fatigue, chills, headache, and nausea since yesterday, worsening over time  Pt denies vomiting, diarrrhea, chest pain, and radiation of symptoms into the back  Pt reports prior gallbladder surgery  No other concerns or complaints today  Review of Systems   Review of Systems   Constitutional: Positive for chills and fatigue  Negative for fever  HENT: Negative for congestion, postnasal drip, rhinorrhea, sore throat, trouble swallowing and voice change  Respiratory: Negative for cough and shortness of breath  Cardiovascular: Negative for chest pain  Gastrointestinal: Positive for abdominal pain and nausea  Negative for diarrhea and vomiting  Musculoskeletal: Positive for myalgias  Neurological: Positive for headaches           Current Medications       Current Outpatient Medications:   •  acetaminophen (TYLENOL) 500 mg tablet, take 1 tablet by mouth every 6 hours if needed for pain (Patient not taking: No sig reported), Disp: , Rfl:   •  acetaminophen (TYLENOL) 500 mg tablet, Take 2 tablets (1,000 mg total) by mouth every 6 (six) hours as needed for mild pain (Patient not taking: Reported on 1/10/2022), Disp: 60 tablet, Rfl: 0  •  ascorbic acid (VITAMIN C) 1000 MG tablet, Take 1,000 mg by mouth daily (Patient not taking: Reported on 11/21/2022), Disp: , Rfl:   •  Cholecalciferol 10 MCG (400 UNIT) CAPS, Take by mouth (Patient not taking: Reported on 11/21/2022), Disp: , Rfl:   •  cyanocobalamin (VITAMIN B-12) 50 MCG tablet, Take 50 mcg by mouth daily (Patient not taking: Reported on 11/21/2022), Disp: , Rfl:   •  diclofenac (VOLTAREN) 75 mg EC tablet, Take 1 tablet (75 mg total) by mouth 2 (two) times a day (Patient not taking: Reported on 1/10/2022), Disp: 60 tablet, Rfl: 0  •  Diclofenac Sodium (VOLTAREN) 1 %, Apply 2 g topically 4 (four) times a day (Patient not taking: Reported on 11/4/2021), Disp: 50 g, Rfl: 0  •  Diclofenac Sodium 1 5 % SOLN, APPLY UP TO 40 DROPS TO THE AFFECTED AREAS 3-4 TIMES DAILY AS NEEDED FOR PAIN (Patient not taking: No sig reported), Disp: , Rfl:   •  hydrOXYzine HCL (ATARAX) 10 mg tablet, Take 1 tablet (10 mg total) by mouth daily at bedtime (Patient not taking: Reported on 11/4/2021), Disp: 30 tablet, Rfl: 0  •  lidocaine (XYLOCAINE) 5 % ointment, Apply 2-3 grams to the affected area 3-4 times daily (Patient not taking: No sig reported), Disp: , Rfl:   •  multivitamin (THERAGRAN) TABS, Take 1 tablet by mouth daily, Disp: , Rfl:   •  pantoprazole (PROTONIX) 40 mg tablet, Take 1 tablet (40 mg total) by mouth daily before breakfast (Patient not taking: Reported on 11/4/2021), Disp: 90 tablet, Rfl: 3    Current Allergies     Allergies as of 11/21/2022 - Reviewed 02/24/2022   Allergen Reaction Noted   • Esomeprazole Diarrhea and GI Intolerance 03/01/2016   • Omeprazole GI Intolerance 12/09/2015            The following portions of the patient's history were reviewed and updated as appropriate: allergies, current medications, past family history, past medical history, past social history, past surgical history and problem list      Past Medical History:   Diagnosis Date   • Anemia    • Anxiety    • History of transfusion 2017    due to vag bleeding   • Insomnia    • Kidney stone    • Mass of breast, left    • Mass of breast, right    • Seasonal allergies        Past Surgical History:   Procedure Laterality Date   • COLONOSCOPY  12/2021   • HYSTERECTOMY     • HYSTEROSCOPY     • KIDNEY STONE SURGERY     • LAPAROSCOPIC CHOLECYSTECTOMY     • MA CORRJ HALLUX VALGUS W/SESMDC W/DIST Ping Glassing Right 09/01/2020    Procedure: Loren Acosta;  Surgeon: Emmanuel Gardiner DPM;  Location: [address] [place] [address];  Service: Podiatry   • TUBAL LIGATION         Family History   Problem Relation Age of Onset   • Hypertension Mother    • Heart disease Father    • Breast cancer Maternal Aunt    • Pancreatic cancer Maternal Uncle    • Stomach cancer Paternal Uncle          Medications have been verified  Objective   BP 90/62   Pulse 89   Temp 99 5 °F (37 5 °C)   Wt 47 8 kg (105 lb 6 4 oz)   SpO2 97%   BMI 22 03 kg/m²   No LMP recorded  Patient has had a hysterectomy  Physical Exam     Physical Exam  Vitals and nursing note reviewed  Constitutional:       General: She is awake  She is in acute distress (obvious pain, holding epigastric area and rocking back and forth)  Appearance: Normal appearance  She is well-developed and well-groomed  She is not ill-appearing, toxic-appearing or diaphoretic  HENT:      Head: Normocephalic and atraumatic  Right Ear: Hearing and external ear normal       Left Ear: Hearing and external ear normal    Eyes:      General: Lids are normal  Vision grossly intact  Gaze aligned appropriately  No scleral icterus  Cardiovascular:      Rate and Rhythm: Normal rate  Pulmonary:      Effort: Pulmonary effort is normal       Comments: Patient is speaking in full sentences with no increased respiratory effort  No audible wheezing or stridor  Abdominal:      General: Abdomen is flat  Bowel sounds are normal       Palpations: Abdomen is soft  Tenderness: There is abdominal tenderness in the right upper quadrant, epigastric area and left upper quadrant  Musculoskeletal:      Cervical back: Normal range of motion  Skin:     General: Skin is warm and dry  Coloration: Skin is jaundiced  Neurological:      Mental Status: She is alert and oriented to person, place, and time  Coordination: Coordination is intact  Gait: Gait is intact  Psychiatric:         Attention and Perception: Attention and perception normal          Mood and Affect: Mood and affect normal          Speech: Speech normal          Behavior: Behavior normal  Behavior is cooperative  Note: Portions of this record may have been created with voice recognition software  Occasional wrong word or "sound a like" substitutions may have occurred due to the inherent limitations of voice recognition software  Please read the chart carefully and recognize, using context, where substitutions have occurred  *

## 2023-03-15 ENCOUNTER — TELEPHONE (OUTPATIENT)
Dept: FAMILY MEDICINE CLINIC | Facility: CLINIC | Age: 53
End: 2023-03-15

## 2023-03-15 NOTE — TELEPHONE ENCOUNTER
Patient was called in response to her VM of having to cancel her appt   Left message for patient to call the office to reschedule appt

## 2023-03-15 NOTE — TELEPHONE ENCOUNTER
[address] [facility] nombre es radha Harper fecha  [facility], desafortunadamente, tengo que cancelar mi wolf  El es da emergencia familiar  No voy a llamar de nuevo para programar otra wolf  Muchas john    You received a voice mail from [facility]

## 2023-05-02 ENCOUNTER — TELEPHONE (OUTPATIENT)
Dept: ADMINISTRATIVE | Facility: OTHER | Age: 53
End: 2023-05-02

## 2023-05-02 NOTE — TELEPHONE ENCOUNTER
----- Message from Marysol King RN sent at 5/2/2023 11:01 AM EDT -----  Regarding: mammo  05/02/23 11:01 AM    Hello, our patient Gloria Lozano has had Mammogram completed/performed  Please assist in updating the patient chart by pulling the Care Everywhere (CE) document  The date of service is 4/2023       Thank you,  Marysol King RN   [address]

## 2023-05-02 NOTE — TELEPHONE ENCOUNTER
Upon review of the In Basket request we were able to locate, review, and update the patient chart as requested for Mammogram     Any additional questions or concerns should be emailed to the Practice Liaisons via the appropriate education email address, please do not reply via In Basket      Thank you  Jose Manuel Palmer

## 2023-05-13 ENCOUNTER — HOSPITAL ENCOUNTER (EMERGENCY)
Facility: HOSPITAL | Age: 53
Discharge: HOME | End: 2023-05-13
Attending: INTERNAL MEDICINE

## 2023-05-13 ENCOUNTER — APPOINTMENT (EMERGENCY)
Dept: CT IMAGING | Facility: HOSPITAL | Age: 53
End: 2023-05-13

## 2023-05-13 VITALS
RESPIRATION RATE: 16 BRPM | WEIGHT: 107.8 LBS | SYSTOLIC BLOOD PRESSURE: 111 MMHG | OXYGEN SATURATION: 98 % | TEMPERATURE: 97.9 F | HEART RATE: 70 BPM | BODY MASS INDEX: 22.53 KG/M2 | DIASTOLIC BLOOD PRESSURE: 71 MMHG

## 2023-05-13 DIAGNOSIS — K59.00 CONSTIPATION: Primary | ICD-10-CM

## 2023-05-13 LAB
ALBUMIN SERPL BCP-MCNC: 4.1 G/DL (ref 3.5–5)
ALP SERPL-CCNC: 82 U/L (ref 34–104)
ALT SERPL W P-5'-P-CCNC: 23 U/L (ref 7–52)
ANION GAP SERPL CALCULATED.3IONS-SCNC: 7 MMOL/L (ref 4–13)
AST SERPL W P-5'-P-CCNC: 22 U/L (ref 13–39)
BASOPHILS # BLD AUTO: 0.03 THOUSANDS/ÂΜL (ref 0–0.1)
BASOPHILS NFR BLD AUTO: 1 % (ref 0–1)
BILIRUB SERPL-MCNC: 0.36 MG/DL (ref 0.2–1)
BILIRUB UR QL STRIP: NEGATIVE
BUN SERPL-MCNC: 18 MG/DL (ref 5–25)
CALCIUM SERPL-MCNC: 9.2 MG/DL (ref 8.4–10.2)
CHLORIDE SERPL-SCNC: 103 MMOL/L (ref 96–108)
CLARITY UR: ABNORMAL
CO2 SERPL-SCNC: 30 MMOL/L (ref 21–32)
COLOR UR: ABNORMAL
CREAT SERPL-MCNC: 0.87 MG/DL (ref 0.6–1.3)
EOSINOPHIL # BLD AUTO: 0.1 THOUSAND/ÂΜL (ref 0–0.61)
EOSINOPHIL NFR BLD AUTO: 2 % (ref 0–6)
ERYTHROCYTE [DISTWIDTH] IN BLOOD BY AUTOMATED COUNT: 14.5 % (ref 11.6–15.1)
GFR SERPL CREATININE-BSD FRML MDRD: 76 ML/MIN/1.73SQ M
GLUCOSE SERPL-MCNC: 89 MG/DL (ref 65–140)
GLUCOSE UR STRIP-MCNC: NEGATIVE MG/DL
HCT VFR BLD AUTO: 42.3 % (ref 34.8–46.1)
HGB BLD-MCNC: 13.4 G/DL (ref 11.5–15.4)
HGB UR QL STRIP.AUTO: NEGATIVE
IMM GRANULOCYTES # BLD AUTO: 0.01 THOUSAND/UL (ref 0–0.2)
IMM GRANULOCYTES NFR BLD AUTO: 0 % (ref 0–2)
KETONES UR STRIP-MCNC: NEGATIVE MG/DL
LEUKOCYTE ESTERASE UR QL STRIP: NEGATIVE
LYMPHOCYTES # BLD AUTO: 2.16 THOUSANDS/ÂΜL (ref 0.6–4.47)
LYMPHOCYTES NFR BLD AUTO: 45 % (ref 14–44)
MCH RBC QN AUTO: 28.8 PG (ref 26.8–34.3)
MCHC RBC AUTO-ENTMCNC: 31.7 G/DL (ref 31.4–37.4)
MCV RBC AUTO: 91 FL (ref 82–98)
MONOCYTES # BLD AUTO: 0.38 THOUSAND/ÂΜL (ref 0.17–1.22)
MONOCYTES NFR BLD AUTO: 8 % (ref 4–12)
NEUTROPHILS # BLD AUTO: 2.06 THOUSANDS/ÂΜL (ref 1.85–7.62)
NEUTS SEG NFR BLD AUTO: 44 % (ref 43–75)
NITRITE UR QL STRIP: NEGATIVE
NRBC BLD AUTO-RTO: 0 /100 WBCS
PH UR STRIP.AUTO: 8 [PH]
PLATELET # BLD AUTO: 195 THOUSANDS/UL (ref 149–390)
PMV BLD AUTO: 11.7 FL (ref 8.9–12.7)
POTASSIUM SERPL-SCNC: 3.6 MMOL/L (ref 3.5–5.3)
PROT SERPL-MCNC: 7 G/DL (ref 6.4–8.4)
PROT UR STRIP-MCNC: NEGATIVE MG/DL
RBC # BLD AUTO: 4.66 MILLION/UL (ref 3.81–5.12)
SODIUM SERPL-SCNC: 140 MMOL/L (ref 135–147)
SP GR UR STRIP.AUTO: 1.02 (ref 1–1.04)
UROBILINOGEN UA: NEGATIVE MG/DL
WBC # BLD AUTO: 4.74 THOUSAND/UL (ref 4.31–10.16)

## 2023-05-13 RX ORDER — POLYETHYLENE GLYCOL 3350 17 G/17G
17 POWDER, FOR SOLUTION ORAL DAILY
Qty: 238 G | Refills: 0 | Status: SHIPPED | OUTPATIENT
Start: 2023-05-13 | End: 2023-05-19 | Stop reason: ALTCHOICE

## 2023-05-13 RX ORDER — DOCUSATE SODIUM 100 MG/1
100 CAPSULE, LIQUID FILLED ORAL EVERY 12 HOURS
Qty: 28 CAPSULE | Refills: 0 | Status: SHIPPED | OUTPATIENT
Start: 2023-05-13 | End: 2023-05-19 | Stop reason: ALTCHOICE

## 2023-05-13 RX ADMIN — IOHEXOL 85 ML: 350 INJECTION, SOLUTION INTRAVENOUS at 21:15

## 2023-05-14 NOTE — ED ATTENDING ATTESTATION
5/13/2023  IRobert MD, saw and evaluated the patient  I have discussed the patient with the resident/non-physician practitioner and agree with the resident's/non-physician practitioner's findings, Plan of Care, and MDM as documented in the resident's/non-physician practitioner's note, except where noted  All available labs and Radiology studies were reviewed  I was present for key portions of any procedure(s) performed by the resident/non-physician practitioner and I was immediately available to provide assistance  At this point I agree with the current assessment done in the Emergency Department  I have conducted an independent evaluation of this patient a history and physical is as follows:    ED Course  ED Course as of 05/13/23 2303   Sat May 13, 2023   2058 Sodium: 140   2058 Anion Gap: 7   2058 Creatinine: 0 87   2058 AST: 22   2058 ALT: 23   2058 Alkaline Phosphatase: 82   2058 TOTAL BILIRUBIN: 0 36   2059 WBC: 4 74   2059 Hemoglobin: 13 4   2059 Platelet Count: 112   2059 Leukocytes, UA: Negative   2059 Nitrite, UA: Negative   2242 CT A/P: No acute process seen  Suspected hepatic hemangioma  58-year-old woman with history of nephrolithiasis, breast mass, fibromyalgia anemia and anxiety presents to ED for evaluation of right-sided abdominal pain  Patient reports that radiates to her right lower quadrant  Patient reports symptoms for last 2 to 3 days no associated nausea, vomiting or diarrhea  Has not tried any medications or therapies for the pain  On exam the patient is awake, alert, and comfortable  Mucous membranes are moist   Neck supple and nontender  The patient's heart is regular without murmurs, rubs, or gallops  Lungs are clear bilaterally with good air movement  Abdomen soft, TTP over RLQ with voluntary guarding, rebound or rigidity  Moves all extremities  Patient neurologically nonfocal  No skin rashes  Back without deformities   Medical decision making: Differential diagnosis includes appendicitis, diverticulitis, colitis, gastroenteritis, nephrolithiasis, pyelonephritis, ISAIAH  Will obtain labs and CT abdomen pelvis          Critical Care Time  Procedures

## 2023-05-14 NOTE — ED PROVIDER NOTES
"History  Chief Complaint   Patient presents with   • Abdominal Pain     Patient states that she has had R sided abdominal pain for a couple of days, sometimes the pain goes to her back and down her leg  No pta medications     44-year-old female presenting to the emergency department for evaluation of abdominal pain  Patient reports a surgical history of \"laser procedure \"and is unsure if she had cholecystectomy, appendectomy in the past   Patient does report that she has had prior imaging confirmed that she has gallstones  Patient reports that over the past approximately 1 month she has had intermittent right upper quadrant abdominal pain that radiates throughout her abdomen and to her back  She states it gets worse with food  She denies any specific food triggers  She denies fever  She does states she has been consuming a lot of carrots, beet juice and that she has been more constipated than usual   She is still passing gas and having bowel movements daily  Denies any urgency or urinary symptoms  Prior to Admission Medications   Prescriptions Last Dose Informant Patient Reported? Taking?    Cholecalciferol 10 MCG (400 UNIT) CAPS   Yes No   Sig: Take by mouth   Patient not taking: Reported on 11/21/2022   Diclofenac Sodium (VOLTAREN) 1 %   No No   Sig: Apply 2 g topically 4 (four) times a day   Patient not taking: Reported on 11/4/2021   Diclofenac Sodium 1 5 % SOLN   Yes No   Sig: APPLY UP TO 40 DROPS TO THE AFFECTED AREAS 3-4 TIMES DAILY AS NEEDED FOR PAIN   Patient not taking: No sig reported   acetaminophen (TYLENOL) 500 mg tablet   Yes No   Sig: take 1 tablet by mouth every 6 hours if needed for pain   Patient not taking: No sig reported   acetaminophen (TYLENOL) 500 mg tablet   No No   Sig: Take 2 tablets (1,000 mg total) by mouth every 6 (six) hours as needed for mild pain   Patient not taking: Reported on 1/10/2022   ascorbic acid (VITAMIN C) 1000 MG tablet   Yes No   Sig: Take 1,000 mg by " mouth daily   Patient not taking: Reported on 11/21/2022   cyanocobalamin (VITAMIN B-12) 50 MCG tablet   Yes No   Sig: Take 50 mcg by mouth daily   Patient not taking: Reported on 11/21/2022   diclofenac (VOLTAREN) 75 mg EC tablet   No No   Sig: Take 1 tablet (75 mg total) by mouth 2 (two) times a day   Patient not taking: Reported on 1/10/2022   famotidine (PEPCID) 20 mg tablet   Yes No   Sig: Take 20 mg by mouth 2 (two) times a day   hydrOXYzine HCL (ATARAX) 10 mg tablet   No No   Sig: Take 1 tablet (10 mg total) by mouth daily at bedtime   Patient not taking: Reported on 11/4/2021   lidocaine (XYLOCAINE) 5 % ointment   Yes No   Sig: Apply 2-3 grams to the affected area 3-4 times daily   Patient not taking: No sig reported   magnesium citrate oral solution   Yes No   Sig: Take 296 mL by mouth   multivitamin (THERAGRAN) TABS   Yes No   Sig: Take 1 tablet by mouth daily   pantoprazole (PROTONIX) 40 mg tablet   No No   Sig: Take 1 tablet (40 mg total) by mouth daily before breakfast   Patient not taking: Reported on 11/4/2021      Facility-Administered Medications: None       Past Medical History:   Diagnosis Date   • Anemia    • Anxiety    • History of transfusion 2017    due to vag bleeding   • Insomnia    • Kidney stone    • Mass of breast, left    • Mass of breast, right    • Seasonal allergies        Past Surgical History:   Procedure Laterality Date   • COLONOSCOPY  12/2021   • HYSTERECTOMY     • HYSTEROSCOPY     • KIDNEY STONE SURGERY     • LAPAROSCOPIC CHOLECYSTECTOMY     • SC CORRJ HALLUX VALGUS W/SESMDC W/DIST METAR OSTEOT Right 09/01/2020    Procedure: Michelle Neil;  Surgeon: Riaz Bennett DPM;  Location: [address] [place] [address] OR;  Service: Podiatry   • TUBAL LIGATION         Family History   Problem Relation Age of Onset   • Hypertension Mother    • Heart disease Father    • Breast cancer Maternal Aunt    • Pancreatic cancer Maternal Uncle    • Stomach cancer Paternal Uncle      I have reviewed and agree with the history as documented  E-Cigarette/Vaping   • E-Cigarette Use Never User      E-Cigarette/Vaping Substances     Social History     Tobacco Use   • Smoking status: Never   • Smokeless tobacco: Never   Vaping Use   • Vaping Use: Never used   Substance Use Topics   • Alcohol use: Not Currently     Alcohol/week: 1 0 standard drink     Types: 1 Glasses of wine per week   • Drug use: Never        Review of Systems   Constitutional: Negative  HENT: Negative  Eyes: Negative  Respiratory: Negative  Cardiovascular: Negative  Gastrointestinal: Positive for abdominal pain  Endocrine: Negative  Genitourinary: Negative  Musculoskeletal: Negative  Skin: Negative  Allergic/Immunologic: Negative  Neurological: Negative  Hematological: Negative  Psychiatric/Behavioral: Negative  All other systems reviewed and are negative  Physical Exam  ED Triage Vitals [05/13/23 1952]   Temperature Pulse Respirations Blood Pressure SpO2   97 9 °F (36 6 °C) 73 18 106/56 99 %      Temp Source Heart Rate Source Patient Position - Orthostatic VS BP Location FiO2 (%)   Tympanic Monitor Sitting Left arm --      Pain Score       --             Orthostatic Vital Signs  Vitals:    05/13/23 1952 05/13/23 2250   BP: 106/56 111/71   Pulse: 73 70   Patient Position - Orthostatic VS: Sitting Lying       Physical Exam  Vitals and nursing note reviewed  Constitutional:       General: She is not in acute distress  Appearance: Normal appearance  She is not ill-appearing, toxic-appearing or diaphoretic  HENT:      Head: Normocephalic and atraumatic  Eyes:      General: No scleral icterus  Right eye: No discharge  Left eye: No discharge  Extraocular Movements: Extraocular movements intact  Conjunctiva/sclera: Conjunctivae normal       Pupils: Pupils are equal, round, and reactive to light  Cardiovascular:      Rate and Rhythm: Normal rate  Pulses: Normal pulses        Heart sounds: Normal heart sounds  No murmur heard  No friction rub  No gallop  Pulmonary:      Effort: Pulmonary effort is normal  No respiratory distress  Breath sounds: Normal breath sounds  No stridor  No wheezing, rhonchi or rales  Abdominal:      General: Abdomen is flat  Bowel sounds are normal  There is no distension  Palpations: Abdomen is soft  Tenderness: There is abdominal tenderness  There is no guarding or rebound  Comments: Mild RLQ abdominal pain to palpation   Musculoskeletal:         General: No swelling  Normal range of motion  Cervical back: Normal range of motion  No rigidity  Right lower leg: No edema  Left lower leg: No edema  Skin:     General: Skin is warm and dry  Capillary Refill: Capillary refill takes less than 2 seconds  Coloration: Skin is not jaundiced  Findings: No bruising or lesion  Neurological:      General: No focal deficit present  Mental Status: She is alert and oriented to person, place, and time  Mental status is at baseline  Psychiatric:         Mood and Affect: Mood normal          Behavior: Behavior normal          Thought Content:  Thought content normal          Judgment: Judgment normal          ED Medications  Medications   iohexol (OMNIPAQUE) 350 MG/ML injection (SINGLE-DOSE) 85 mL (85 mL Intravenous Given 5/13/23 2115)       Diagnostic Studies  Results Reviewed     Procedure Component Value Units Date/Time    Comprehensive metabolic panel [[id number]] Collected: 05/13/23 2027    Lab Status: Final result Specimen: Blood from Arm, Right Updated: 05/13/23 2047     Sodium 140 mmol/L      Potassium 3 6 mmol/L      Chloride 103 mmol/L      CO2 30 mmol/L      ANION GAP 7 mmol/L      BUN 18 mg/dL      Creatinine 0 87 mg/dL      Glucose 89 mg/dL      Calcium 9 2 mg/dL      AST 22 U/L      ALT 23 U/L      Alkaline Phosphatase 82 U/L      Total Protein 7 0 g/dL      Albumin 4 1 g/dL      Total Bilirubin 0 36 mg/dL eGFR 76 ml/min/1 73sq m     Narrative:      [place] guidelines for Chronic Kidney Disease (CKD):   •  Stage 1 with normal or high GFR (GFR > 90 mL/min/1 73 square meters)  •  Stage 2 Mild CKD (GFR = 60-89 mL/min/1 73 square meters)  •  Stage 3A Moderate CKD (GFR = 45-59 mL/min/1 73 square meters)  •  Stage 3B Moderate CKD (GFR = 30-44 mL/min/1 73 square meters)  •  Stage 4 Severe CKD (GFR = 15-29 mL/min/1 73 square meters)  •  Stage 5 End Stage CKD (GFR <15 mL/min/1 73 square meters)  Note: GFR calculation is accurate only with a steady state creatinine    UA (URINE) with reflex to Scope [[id number]]  (Abnormal) Collected: 05/13/23 2022    Lab Status: Final result Specimen: Urine, Clean Catch Updated: 05/13/23 2042     Color, UA Straw     Clarity, UA Cloudy     Specific [place], UA 1 020     pH, UA 8 0     Leukocytes, UA Negative     Nitrite, UA Negative     Protein, UA Negative mg/dl      Glucose, UA Negative mg/dl      Ketones, UA Negative mg/dl      Bilirubin, UA Negative     Occult Blood, UA Negative     UROBILINOGEN UA Negative mg/dL     CBC and differential [[id number]]  (Abnormal) Collected: 05/13/23 2027    Lab Status: Final result Specimen: Blood from Arm, Right Updated: 05/13/23 2033     WBC 4 74 Thousand/uL      RBC 4 66 Million/uL      Hemoglobin 13 4 g/dL      Hematocrit 42 3 %      MCV 91 fL      MCH 28 8 pg      MCHC 31 7 g/dL      RDW 14 5 %      MPV 11 7 fL      Platelets 220 Thousands/uL      nRBC 0 /100 WBCs      Neutrophils Relative 44 %      Immat GRANS % 0 %      Lymphocytes Relative 45 %      Monocytes Relative 8 %      Eosinophils Relative 2 %      Basophils Relative 1 %      Neutrophils Absolute 2 06 Thousands/µL      Immature Grans Absolute 0 01 Thousand/uL      Lymphocytes Absolute 2 16 Thousands/µL      Monocytes Absolute 0 38 Thousand/µL      Eosinophils Absolute 0 10 Thousand/µL      Basophils Absolute 0 03 Thousands/µL                  CT abdomen pelvis with contrast   Final Result by Annmarie Limon DO (05/13 2239)      No acute process seen  Suspected hepatic hemangioma  Other findings as above  Workstation performed: [id number]               Procedures  Procedures      ED Course                                       Medical Decision Making  58-year-old female presenting to the emergency department for evaluation of abdominal pain  Physical exam findings as noted above  Vital stable in emergency department  Pain under control  Cross-sectional imaging demonstrates significant stool burden extending from stomach to rectum  No acute infectious/inflammatory findings otherwise  Suspect abdominal discomfort is secondary to intestinal distention  We will give patient bowel regimen, instructions to follow-up  Patient verbalized understanding  Patient discharged  Note: I personally called this patient back at 4:20 pm on 05/14/23 to ensure patient was aware of CT finding of hepatic hemangioma  I expressed these are typically benign but that she should let her physician she is following-up with at the end of this month know in the event further imaging is warranted (e g  in 1 year)  She verbalized understanding, mentioning that she has a family history of cancer  Amount and/or Complexity of Data Reviewed  Labs: ordered  Radiology: ordered  Risk  OTC drugs  Prescription drug management  Disposition  Final diagnoses:   Constipation     Time reflects when diagnosis was documented in both MDM as applicable and the Disposition within this note     Time User Action Codes Description Comment    5/13/2023 10:48 PM Samantha Reyez Add [K59 00] Constipation       ED Disposition     ED Disposition   Discharge    Condition   Stable    Date/Time   Sat May 13, 2023 10:48 PM    Comment   [facility] discharge to home/self care                 Follow-up Information     Follow up With Specialties Details Why Contact Info Additional Information    [facility]   [address] [id number]  [address] [facility], [address]    [place], [place] [id number] [phone]          Discharge Medication List as of 5/13/2023 10:50 PM      START taking these medications    Details   docusate sodium (COLACE) 100 mg capsule Take 1 capsule (100 mg total) by mouth every 12 (twelve) hours for 14 days, Starting Sat 5/13/2023, Until Sat 5/27/2023, Normal      polyethylene glycol (MIRALAX) 17 g packet Take 17 g by mouth daily for 14 days, Starting Sat 5/13/2023, Until Sat 5/27/2023, Normal         CONTINUE these medications which have NOT CHANGED    Details   !! acetaminophen (TYLENOL) 500 mg tablet take 1 tablet by mouth every 6 hours if needed for pain, Historical Med      !! acetaminophen (TYLENOL) 500 mg tablet Take 2 tablets (1,000 mg total) by mouth every 6 (six) hours as needed for mild pain, Starting Sun 8/29/2021, Normal      ascorbic acid (VITAMIN C) 1000 MG tablet Take 1,000 mg by mouth daily, Historical Med      Cholecalciferol 10 MCG (400 UNIT) CAPS Take by mouth, Historical Med      cyanocobalamin (VITAMIN B-12) 50 MCG tablet Take 50 mcg by mouth daily, Historical Med      diclofenac (VOLTAREN) 75 mg EC tablet Take 1 tablet (75 mg total) by mouth 2 (two) times a day, Starting Mon 10/25/2021, Normal      Diclofenac Sodium (VOLTAREN) 1 % Apply 2 g topically 4 (four) times a day, Starting Mon 9/20/2021, Normal      Diclofenac Sodium 1 5 % SOLN APPLY UP TO 40 DROPS TO THE AFFECTED AREAS 3-4 TIMES DAILY AS NEEDED FOR PAIN, Historical Med      famotidine (PEPCID) 20 mg tablet Take 20 mg by mouth 2 (two) times a day, Starting Tue 11/22/2022, Historical Med      hydrOXYzine HCL (ATARAX) 10 mg tablet Take 1 tablet (10 mg total) by mouth daily at bedtime, Starting Mon 9/20/2021, Normal lidocaine (XYLOCAINE) 5 % ointment Apply 2-3 grams to the affected area 3-4 times daily, Historical Med      magnesium citrate oral solution Take 296 mL by mouth, Historical Med      multivitamin (THERAGRAN) TABS Take 1 tablet by mouth daily, Historical Med      pantoprazole (PROTONIX) 40 mg tablet Take 1 tablet (40 mg total) by mouth daily before breakfast, Starting Mon 9/20/2021, Normal       !! - Potential duplicate medications found  Please discuss with provider  No discharge procedures on file  PDMP Review     None           ED Provider  Attending physically available and evaluated M[place]  I managed the patient along with the ED Attending      Electronically Signed by         Cherri Dill,   05/14/23 [facility], DO  05/14/23 5805

## 2023-05-19 ENCOUNTER — OFFICE VISIT (OUTPATIENT)
Dept: FAMILY MEDICINE CLINIC | Facility: CLINIC | Age: 53
End: 2023-05-19

## 2023-05-19 ENCOUNTER — APPOINTMENT (OUTPATIENT)
Dept: LAB | Facility: HOSPITAL | Age: 53
End: 2023-05-19

## 2023-05-19 VITALS
HEART RATE: 71 BPM | DIASTOLIC BLOOD PRESSURE: 66 MMHG | WEIGHT: 103.6 LBS | SYSTOLIC BLOOD PRESSURE: 103 MMHG | HEIGHT: 58 IN | BODY MASS INDEX: 21.75 KG/M2 | TEMPERATURE: 96.8 F | OXYGEN SATURATION: 100 % | RESPIRATION RATE: 20 BRPM

## 2023-05-19 DIAGNOSIS — R10.13 EPIGASTRIC PAIN: ICD-10-CM

## 2023-05-19 DIAGNOSIS — Z80.0 FAMILY HISTORY OF PANCREATIC CANCER: ICD-10-CM

## 2023-05-19 DIAGNOSIS — R10.11 RIGHT UPPER QUADRANT PAIN: Primary | ICD-10-CM

## 2023-05-19 DIAGNOSIS — Z13.6 SCREENING FOR CARDIOVASCULAR CONDITION: ICD-10-CM

## 2023-05-19 DIAGNOSIS — K76.9 HEPATIC LESION: ICD-10-CM

## 2023-05-19 PROBLEM — Z23 NEEDS FLU SHOT: Status: RESOLVED | Noted: 2021-10-25 | Resolved: 2023-05-19

## 2023-05-19 PROBLEM — R59.0 LYMPHADENOPATHY OF LEFT CERVICAL REGION: Status: RESOLVED | Noted: 2021-05-25 | Resolved: 2023-05-19

## 2023-05-19 PROBLEM — Z11.59 ENCOUNTER FOR HEPATITIS C SCREENING TEST FOR LOW RISK PATIENT: Status: RESOLVED | Noted: 2021-09-20 | Resolved: 2023-05-19

## 2023-05-19 PROBLEM — Z12.31 ENCOUNTER FOR SCREENING MAMMOGRAM FOR MALIGNANT NEOPLASM OF BREAST: Status: RESOLVED | Noted: 2021-09-20 | Resolved: 2023-05-19

## 2023-05-19 LAB
ALBUMIN SERPL BCP-MCNC: 4.9 G/DL (ref 3.5–5)
ALP SERPL-CCNC: 76 U/L (ref 34–104)
ALT SERPL W P-5'-P-CCNC: 27 U/L (ref 7–52)
ANION GAP SERPL CALCULATED.3IONS-SCNC: 8 MMOL/L (ref 4–13)
AST SERPL W P-5'-P-CCNC: 20 U/L (ref 13–39)
BILIRUB SERPL-MCNC: 0.69 MG/DL (ref 0.2–1)
BUN SERPL-MCNC: 18 MG/DL (ref 5–25)
CALCIUM SERPL-MCNC: 10.1 MG/DL (ref 8.4–10.2)
CHLORIDE SERPL-SCNC: 103 MMOL/L (ref 96–108)
CHOLEST SERPL-MCNC: 225 MG/DL
CO2 SERPL-SCNC: 28 MMOL/L (ref 21–32)
CREAT SERPL-MCNC: 0.62 MG/DL (ref 0.6–1.3)
GFR SERPL CREATININE-BSD FRML MDRD: 103 ML/MIN/1.73SQ M
GLUCOSE P FAST SERPL-MCNC: 80 MG/DL (ref 65–99)
HDLC SERPL-MCNC: 90 MG/DL
LDLC SERPL CALC-MCNC: 123 MG/DL (ref 0–100)
LIPASE SERPL-CCNC: 37 U/L (ref 11–82)
NONHDLC SERPL-MCNC: 135 MG/DL
POTASSIUM SERPL-SCNC: 3.8 MMOL/L (ref 3.5–5.3)
PROT SERPL-MCNC: 8.1 G/DL (ref 6.4–8.4)
SODIUM SERPL-SCNC: 139 MMOL/L (ref 135–147)
TRIGL SERPL-MCNC: 60 MG/DL
TSH SERPL DL<=0.05 MIU/L-ACNC: 2.97 UIU/ML (ref 0.45–4.5)

## 2023-05-19 NOTE — ASSESSMENT & PLAN NOTE
Previously on PPI  History of h  Pylori, treated  Recent RUQ/epigastric pain x  2 weeks, no specific pattern with food, recommend diet as tolerated and hold treatment for now pending labs and imaging  Normal CT abd/pelvis in ED in 5/2023

## 2023-05-19 NOTE — ASSESSMENT & PLAN NOTE
Unclear cause due to history of cholecystectomy  Check US for RUQ  Normal liver enzymes, reviewed ED visit on 5/13 for same, do not suspect constipation as previously suggested  Radiating to midback b/l  Referred to GI  Recommend tylenol as needed for now, avoid NSAIDs  Consider acid blocker pending labs and US abdomen  Follow-up in 2 weeks for physical  No ETOH use     Lab Results   Component Value Date     04/20/2018    SODIUM 139 05/19/2023    K 3 8 05/19/2023     05/19/2023    CO2 28 05/19/2023    ANIONGAP 10 04/20/2018    AGAP 8 05/19/2023    BUN 18 05/19/2023    CREATININE 0 62 05/19/2023    GLUC 89 05/13/2023    GLUF 80 05/19/2023    CALCIUM 10 1 05/19/2023    AST 20 05/19/2023    ALT 27 05/19/2023    ALKPHOS 76 05/19/2023    PROT 6 8 04/20/2018    TP 8 1 05/19/2023    BILITOT 0 3 04/20/2018    TBILI 0 69 05/19/2023    EGFR 103 05/19/2023

## 2023-05-19 NOTE — ASSESSMENT & PLAN NOTE
Mother  from pancreatic cancer, discovered at age 80 with stage 4  Check lipase  Plan for genetic testing per gyn in July  Suspect patient concerned due to history with mother

## 2023-05-19 NOTE — ASSESSMENT & PLAN NOTE
Suspected hepatic hemangioma, incidental finding per CT abd done in ER  Follow-up GI  Referred today

## 2023-05-19 NOTE — PROGRESS NOTES
Name: Quentin Mancera      : 1970      MRN: [id number]  Encounter Provider: Tyrell Tobias PA-C  Encounter Date: 2023   Encounter department: [facility]     1  Right upper quadrant pain  Assessment & Plan:  Unclear cause due to history of cholecystectomy  Check US for RUQ  Normal liver enzymes, reviewed ED visit on  for same, do not suspect constipation as previously suggested  Radiating to midback b/l  Referred to GI  Recommend tylenol as needed for now, avoid NSAIDs  Consider acid blocker pending labs and US abdomen  Follow-up in 2 weeks for physical      Lab Results   Component Value Date     2018    SODIUM 139 2023    K 3 8 2023     2023    CO2 28 2023    ANIONGAP 10 2018    AGAP 8 2023    BUN 18 2023    CREATININE 0 62 2023    GLUC 89 2023    GLUF 80 2023    CALCIUM 10 1 2023    AST 20 2023    ALT 27 2023    ALKPHOS 76 2023    PROT 6 8 2018    TP 8 1 2023    BILITOT 0 3 2018    TBILI 0 69 2023    EGFR 103 2023         Orders:  -     US abdomen complete; Future; Expected date: 2023    2  Epigastric pain  Assessment & Plan:  Previously on PPI  History of h  Pylori, treated  Recent RUQ/epigastric pain x  2 weeks, no specific pattern with food, recommend diet as tolerated and hold treatment for now pending labs and imaging  Normal CT abd/pelvis in ED in 2023  Orders:  -     Lipase; Future  -     TSH, 3rd generation with Free T4 reflex; Future  -     H  pylori antigen, stool; Future  -     US abdomen complete; Future; Expected date: 2023  -     Ambulatory Referral to Gastroenterology; Future    3  Family history of pancreatic cancer  Assessment & Plan: Mother  from pancreatic cancer, discovered at age 80 with stage 4  Check lipase  Plan for genetic testing per gyn in July   Suspect patient "concerned due to history with mother  Orders:  -     Lipase; Future    4  Hepatic lesion  Assessment & Plan:  Suspected hepatic hemangioma, incidental finding per CT abd done in ER  Follow-up GI  Referred today  5  Screening for cardiovascular condition  -     Lipid panel; Future  -     Comprehensive metabolic panel; Future         Subjective      Que Rose is a 47 y o  female with hx of cholecystectomy who presents with after recent ED visit on 5/13/23 for abdominal pain/back pain for same concerns x 2 weeks  She has been having an intermittent headache which she does not usually get  Does not take anything for it  Started with abdominal pain with intermittent nausea which was similar to how she felt when she had gallbladder issue  She was concerned too because 2 years ago her mother who was very healthy at age 80 had nonspecific complaints and it turned out to be stage 4 pancreatic cancer  She will be getting genetic screening tests done in July  No recent travel  History of h  Pylori that was treated a few years ago, no burning or acid like she had before  Loose stools, daily  Not usually constipated because has carrot juice, greens, salads  Was confused how she was constipated with CT scan done in ED because she has been eating healthy  12/2021 was last colonoscopy which showed some diverticula  Feeling worse since ER visit and better  Did \"colon cleanser\" yesterday in [place] with suppository and treatments  Not taking any medications, tried miralax which may have helped  Did not take stool softener  No fever, chills, night sweats  Worse with eating breads and rice  No postprandial pain  Has appetite  Drinking a lot of water  No urinary issues  No blood in stool  Back pain is coming from the front and radiating to back  Headache is both sides, no vision changes  , Urban Juan, and daughter are present for exam and helped to provide a portion of history       Review of Systems   Constitutional: Negative " for appetite change, chills, fever and unexpected weight change  Respiratory: Negative for shortness of breath  Cardiovascular: Negative for chest pain  Gastrointestinal: Positive for abdominal pain and nausea  Negative for constipation, diarrhea and vomiting  Genitourinary: Negative for decreased urine volume, difficulty urinating, flank pain, menstrual problem and urgency  Musculoskeletal: Positive for back pain  Neurological: Positive for headaches  Negative for dizziness, tremors, seizures, syncope, facial asymmetry, speech difficulty, weakness, light-headedness and numbness         Current Outpatient Medications on File Prior to Visit   Medication Sig   • ascorbic acid (VITAMIN C) 1000 MG tablet Take 1,000 mg by mouth daily   • Cholecalciferol 10 MCG (400 UNIT) CAPS Take by mouth   • cyanocobalamin (VITAMIN B-12) 50 MCG tablet Take 50 mcg by mouth daily   • magnesium citrate oral solution Take 296 mL by mouth   • multivitamin (THERAGRAN) TABS Take 1 tablet by mouth daily   • [DISCONTINUED] docusate sodium (COLACE) 100 mg capsule Take 1 capsule (100 mg total) by mouth every 12 (twelve) hours for 14 days   • [DISCONTINUED] famotidine (PEPCID) 20 mg tablet Take 20 mg by mouth 2 (two) times a day   • [DISCONTINUED] polyethylene glycol (MIRALAX) 17 g packet Take 17 g by mouth daily for 14 days   • [DISCONTINUED] acetaminophen (TYLENOL) 500 mg tablet take 1 tablet by mouth every 6 hours if needed for pain (Patient not taking: No sig reported)   • [DISCONTINUED] acetaminophen (TYLENOL) 500 mg tablet Take 2 tablets (1,000 mg total) by mouth every 6 (six) hours as needed for mild pain (Patient not taking: Reported on 1/10/2022)   • [DISCONTINUED] diclofenac (VOLTAREN) 75 mg EC tablet Take 1 tablet (75 mg total) by mouth 2 (two) times a day (Patient not taking: Reported on 1/10/2022)   • [DISCONTINUED] Diclofenac Sodium (VOLTAREN) 1 % Apply 2 g topically 4 (four) times a day (Patient not taking: Reported on "11/4/2021)   • [DISCONTINUED] Diclofenac Sodium 1 5 % SOLN APPLY UP TO 40 DROPS TO THE AFFECTED AREAS 3-4 TIMES DAILY AS NEEDED FOR PAIN (Patient not taking: No sig reported)   • [DISCONTINUED] hydrOXYzine HCL (ATARAX) 10 mg tablet Take 1 tablet (10 mg total) by mouth daily at bedtime (Patient not taking: Reported on 11/4/2021)   • [DISCONTINUED] lidocaine (XYLOCAINE) 5 % ointment Apply 2-3 grams to the affected area 3-4 times daily (Patient not taking: No sig reported)   • [DISCONTINUED] pantoprazole (PROTONIX) 40 mg tablet Take 1 tablet (40 mg total) by mouth daily before breakfast (Patient not taking: Reported on 11/4/2021)       Objective     /66 (BP Location: Left arm, Patient Position: Sitting, Cuff Size: Standard)   Pulse 71   Temp (!) 96 8 °F (36 °C) (Temporal)   Resp 20   Ht 4' 10\" (1 473 m)   Wt 47 kg (103 lb 9 6 oz)   SpO2 100%   BMI 21 65 kg/m²     Physical Exam  Vitals and nursing note reviewed  Constitutional:       General: She is not in acute distress  Appearance: Normal appearance  She is not ill-appearing or diaphoretic  Eyes:      General: No scleral icterus  Cardiovascular:      Rate and Rhythm: Normal rate and regular rhythm  Pulses: Normal pulses  Pulmonary:      Effort: Pulmonary effort is normal       Breath sounds: Normal breath sounds  Abdominal:      Palpations: Abdomen is soft  Tenderness: There is abdominal tenderness in the right upper quadrant and epigastric area  There is no right CVA tenderness, left CVA tenderness, guarding or rebound  Negative signs include Ontiveros's sign, McBurney's sign, psoas sign and obturator sign  Skin:     General: Skin is warm and dry  Neurological:      Mental Status: She is alert and oriented to person, place, and time         Court Guido PA-C"

## 2023-05-20 LAB — H PYLORI AG STL QL IA: NEGATIVE

## 2023-05-23 RX ORDER — FAMOTIDINE 20 MG/1
20 TABLET, FILM COATED ORAL 2 TIMES DAILY
Qty: 180 TABLET | Refills: 0 | Status: SHIPPED | OUTPATIENT
Start: 2023-05-23 | End: 2023-08-21

## 2023-06-02 ENCOUNTER — OFFICE VISIT (OUTPATIENT)
Dept: FAMILY MEDICINE CLINIC | Facility: CLINIC | Age: 53
End: 2023-06-02
Payer: COMMERCIAL

## 2023-06-02 VITALS
OXYGEN SATURATION: 98 % | HEIGHT: 58 IN | DIASTOLIC BLOOD PRESSURE: 60 MMHG | SYSTOLIC BLOOD PRESSURE: 90 MMHG | RESPIRATION RATE: 17 BRPM | HEART RATE: 70 BPM | BODY MASS INDEX: 22.29 KG/M2 | TEMPERATURE: 98.2 F | WEIGHT: 106.2 LBS

## 2023-06-02 DIAGNOSIS — H91.91 DECREASED HEARING OF RIGHT EAR: ICD-10-CM

## 2023-06-02 DIAGNOSIS — R10.11 RIGHT UPPER QUADRANT PAIN: ICD-10-CM

## 2023-06-02 DIAGNOSIS — Z00.00 ANNUAL PHYSICAL EXAM: Primary | ICD-10-CM

## 2023-06-02 PROCEDURE — 99396 PREV VISIT EST AGE 40-64: CPT | Performed by: PHYSICIAN ASSISTANT

## 2023-06-02 NOTE — PROGRESS NOTES
ADULT ANNUAL PHYSICAL   [facility] PRIMARY CARE [facility]    NAME: Blanca Sims  AGE: 47 y o  SEX: female  : 1970     DATE: 2023     Assessment and Plan:     Problem List Items Addressed This Visit        Other    Right upper quadrant pain     Unclear cause, suspected due to constipation/anxiety due to mother hx of pancreatic cancer recently passed away  Improved after colon cleanser treatment, pending US abdomen scheduled for next week and GI consult on 23  Hx of cholecystectomy  Continue avoid dietary triggers, advanced diet as tolerated  Lab Results   Component Value Date    LIPASE 37 2023     Lab Results   Component Value Date    AGAP 8 2023    ALKPHOS 76 2023    ALT 27 2023    ANIONGAP 10 2018    AST 20 2023    BILITOT 0 3 2018    BUN 18 2023    CALCIUM 10 1 2023     2023    CO2 28 2023    CREATININE 0 62 2023    EGFR 103 2023    GLUC 89 2023    GLUF 80 2023    K 3 8 2023     2018    PROT 6 8 2018    SODIUM 139 2023    TBILI 0 69 2023    TP 8 1 2023             Other Visit Diagnoses     Annual physical exam    -  Primary    Decreased hearing of right ear        referred for comprehensive hearing test, known history of SNHL R side     Relevant Orders    Ambulatory Referral to Audiology          Immunizations and preventive care screenings were discussed with patient today  Appropriate education was printed on patient's after visit summary  Counseling:  Alcohol/drug use: discussed moderation in alcohol intake, the recommendations for healthy alcohol use, and avoidance of illicit drug use  Dental Health: discussed importance of regular tooth brushing, flossing, and dental visits    Injury prevention: discussed safety/seat belts, safety helmets, smoke detectors, carbon dioxide detectors, and smoking near bedding or upholstery  Sexual health: discussed sexually transmitted diseases, partner selection, use of condoms, avoidance of unintended pregnancy, and contraceptive alternatives  Exercise: the importance of regular exercise/physical activity was discussed  Recommend exercise 3-5 times per week for at least 30 minutes  Return in about 1 year (around 2024) for Annual physical      Chief Complaint:     Chief Complaint   Patient presents with   • Physical Exam      History of Present Illness:     Adult Annual Physical   Patient here for a comprehensive physical exam  The patient reports problems - RUQ pain, less severe after colon cleanser  Works from [email or web address] to [address] [place] [address] [employer] to sleep 2am  Doing better overall  Does not like taking medications, prefers natural methods  Ongoing hearing issues primarily R side  Granddaughter, toddler, present for exam      Diet and Physical Activity  Diet/Nutrition: well balanced diet  Vegetables   Exercise: no formal exercise  Depression Screening  PHQ-2/9 Depression Screening    Little interest or pleasure in doing things: 0 - not at all  Feeling down, depressed, or hopeless: 0 - not at all  Trouble falling or staying asleep, or sleeping too much: 1 - several days  Feeling tired or having little energy: 2 - more than half the days  Poor appetite or overeatin - not at all  Feeling bad about yourself - or that you are a failure or have let yourself or your family down: 0 - not at all  Trouble concentrating on things, such as reading the newspaper or watching television: 3 - nearly every day  Moving or speaking so slowly that other people could have noticed   Or the opposite - being so fidgety or restless that you have been moving around a lot more than usual: 0 - not at all  Thoughts that you would be better off dead, or of hurting yourself in some way: 0 - not at all  PHQ-9 Score: 6   PHQ-9 Interpretation: Mild depression        General Health  Sleep: sleeps well  6-7 hours  Hearing: decreased - right  Vision: goes for regular eye exams and wears glasses  Dental: regular dental visits and brushes teeth twice daily  /GYN Health  Patient is: postmenopausal  Last menstrual period: hysterectomy   Contraceptive method: hysterctomy  Review of Systems:     Review of Systems   Constitutional: Negative for chills and fever  HENT: Negative for ear pain and sore throat  Eyes: Negative for pain and visual disturbance  Respiratory: Negative for cough and shortness of breath  Cardiovascular: Negative for chest pain and palpitations  Gastrointestinal: Positive for abdominal pain (intermittent RUQ)  Negative for vomiting  Genitourinary: Negative for dysuria and hematuria  Musculoskeletal: Negative for arthralgias and back pain  Skin: Negative for color change and rash  Neurological: Negative for seizures and syncope  Psychiatric/Behavioral: Positive for sleep disturbance  The patient is nervous/anxious  All other systems reviewed and are negative       Past Medical History:     Past Medical History:   Diagnosis Date   • Anemia    • Anxiety    • Depression 2/17/2020   • History of transfusion 2017    due to vag bleeding   • Insomnia    • Kidney stone    • Mass of breast, left    • Mass of breast, right    • Seasonal allergies       Past Surgical History:     Past Surgical History:   Procedure Laterality Date   • COLONOSCOPY  12/2021   • HYSTERECTOMY     • HYSTEROSCOPY     • KIDNEY STONE SURGERY     • LAPAROSCOPIC CHOLECYSTECTOMY     • MO CORRJ HALLUX VALGUS W/SESMDC W/DIST Nehemiah Field Right 09/01/2020    Procedure: Flavio Dai;  Surgeon: Lesa Cooks, DPM;  Location: [address] [place] [address];  Service: Podiatry   • TUBAL LIGATION        Social History:     Social History     Socioeconomic History   • Marital status: /Civil Union     Spouse name: None   • Number of children: None   • Years of education: None   • Highest education level: None "  Occupational History   • None   Tobacco Use   • Smoking status: Never   • Smokeless tobacco: Never   Vaping Use   • Vaping Use: Never used   Substance and Sexual Activity   • Alcohol use: Not Currently     Alcohol/week: 1 0 standard drink of alcohol     Types: 1 Glasses of wine per week   • Drug use: Never   • Sexual activity: Yes     Partners: Male     Birth control/protection: None   Other Topics Concern   • None   Social History Narrative   • None     Social Determinants of Health     Financial Resource Strain: Not on file   Food Insecurity: Not on file   Transportation Needs: Not on file   Physical Activity: Not on file   Stress: Not on file   Social Connections: Not on file   Intimate Partner Violence: Not on file   Housing Stability: Not on file      Family History:     Family History   Problem Relation Age of Onset   • Hypertension Mother    • Heart disease Father    • Breast cancer Maternal Aunt    • Pancreatic cancer Maternal Uncle    • Stomach cancer Paternal Uncle       Current Medications:     Current Outpatient Medications   Medication Sig Dispense Refill   • ascorbic acid (VITAMIN C) 1000 MG tablet Take 1,000 mg by mouth daily     • Cholecalciferol 10 MCG (400 UNIT) CAPS Take by mouth     • cyanocobalamin (VITAMIN B-12) 50 MCG tablet Take 50 mcg by mouth daily     • magnesium citrate oral solution Take 296 mL by mouth     • multivitamin (THERAGRAN) TABS Take 1 tablet by mouth daily       No current facility-administered medications for this visit  Allergies: Allergies   Allergen Reactions   • Esomeprazole Diarrhea and GI Intolerance   • Omeprazole GI Intolerance      Physical Exam:     BP 90/60 (BP Location: Left arm, Patient Position: Sitting, Cuff Size: Standard)   Pulse 70   Temp 98 2 °F (36 8 °C) (Tympanic)   Resp 17   Ht 4' 10\" (1 473 m)   Wt 48 2 kg (106 lb 3 2 oz)   SpO2 98%   BMI 22 20 kg/m²     Physical Exam  Vitals and nursing note reviewed     Constitutional:       General: " She is not in acute distress  Appearance: She is well-developed  HENT:      Head: Normocephalic and atraumatic  Eyes:      Extraocular Movements: Extraocular movements intact  Conjunctiva/sclera: Conjunctivae normal       Pupils: Pupils are equal, round, and reactive to light  Cardiovascular:      Rate and Rhythm: Normal rate and regular rhythm  Heart sounds: No murmur heard  Pulmonary:      Effort: Pulmonary effort is normal  No respiratory distress  Breath sounds: Normal breath sounds  Abdominal:      Palpations: Abdomen is soft  Tenderness: There is no abdominal tenderness  Musculoskeletal:         General: No swelling  Cervical back: Neck supple  Skin:     General: Skin is warm and dry  Capillary Refill: Capillary refill takes less than 2 seconds  Neurological:      Mental Status: She is alert     Psychiatric:         Mood and Affect: Mood normal           Maricel Jesus PA-C  [address]

## 2023-06-04 PROBLEM — F32.A DEPRESSION: Status: RESOLVED | Noted: 2020-02-17 | Resolved: 2023-06-04

## 2023-06-04 PROBLEM — F41.9 ANXIETY: Status: RESOLVED | Noted: 2021-09-20 | Resolved: 2023-06-04

## 2023-06-05 ENCOUNTER — HOSPITAL ENCOUNTER (OUTPATIENT)
Dept: ULTRASOUND IMAGING | Facility: MEDICAL CENTER | Age: 53
Discharge: HOME | End: 2023-06-05
Payer: COMMERCIAL

## 2023-06-05 DIAGNOSIS — R10.11 RIGHT UPPER QUADRANT PAIN: ICD-10-CM

## 2023-06-05 DIAGNOSIS — R10.13 EPIGASTRIC PAIN: ICD-10-CM

## 2023-06-05 PROCEDURE — 76700 US EXAM ABDOM COMPLETE: CPT

## 2023-06-05 NOTE — ASSESSMENT & PLAN NOTE
Unclear cause, suspected due to constipation/anxiety due to mother hx of pancreatic cancer recently passed away  Improved after colon cleanser treatment, pending US abdomen scheduled for next week and GI consult on 6/21/23  Hx of cholecystectomy  Continue avoid dietary triggers, advanced diet as tolerated      Lab Results   Component Value Date    LIPASE 37 05/19/2023     Lab Results   Component Value Date    AGAP 8 05/19/2023    ALKPHOS 76 05/19/2023    ALT 27 05/19/2023    ANIONGAP 10 04/20/2018    AST 20 05/19/2023    BILITOT 0 3 04/20/2018    BUN 18 05/19/2023    CALCIUM 10 1 05/19/2023     05/19/2023    CO2 28 05/19/2023    CREATININE 0 62 05/19/2023    EGFR 103 05/19/2023    GLUC 89 05/13/2023    GLUF 80 05/19/2023    K 3 8 05/19/2023     04/20/2018    PROT 6 8 04/20/2018    SODIUM 139 05/19/2023    TBILI 0 69 05/19/2023    TP 8 1 05/19/2023

## 2023-06-21 ENCOUNTER — OFFICE VISIT (OUTPATIENT)
Dept: GASTROENTEROLOGY | Facility: AMBULARY SURGERY CENTER | Age: 53
End: 2023-06-21
Payer: COMMERCIAL

## 2023-06-21 VITALS
BODY MASS INDEX: 22.42 KG/M2 | OXYGEN SATURATION: 98 % | WEIGHT: 106.8 LBS | DIASTOLIC BLOOD PRESSURE: 76 MMHG | SYSTOLIC BLOOD PRESSURE: 110 MMHG | HEIGHT: 58 IN | HEART RATE: 74 BPM

## 2023-06-21 DIAGNOSIS — K76.9 HEPATIC LESION: Primary | ICD-10-CM

## 2023-06-21 DIAGNOSIS — R10.13 EPIGASTRIC PAIN: ICD-10-CM

## 2023-06-21 DIAGNOSIS — R10.11 RIGHT UPPER QUADRANT PAIN: ICD-10-CM

## 2023-06-21 DIAGNOSIS — Z80.0 FAMILY HISTORY OF PANCREATIC CANCER: ICD-10-CM

## 2023-06-21 PROCEDURE — 99244 OFF/OP CNSLTJ NEW/EST MOD 40: CPT | Performed by: INTERNAL MEDICINE

## 2023-06-21 RX ORDER — FAMOTIDINE 40 MG/1
40 TABLET, FILM COATED ORAL 2 TIMES DAILY
Qty: 60 TABLET | Refills: 3 | Status: SHIPPED | OUTPATIENT
Start: 2023-06-21

## 2023-06-21 NOTE — PROGRESS NOTES
Consultation - [facility]  Hoyt Libman 47 y o  female MRN: [id number]          Assessment & Plan:    Pleasant 53-year-old female with 4 to 6 months of abdominal pain, usually worse postprandially, associate with nausea and headaches  Strong family history pancreatic cancer, gastric cancer  Recent CT scan which was unremarkable except for hemangioma, confirmed on ultrasound  Normal laboratory studies  1   Epigastric abdominal pain: Differential including peptic ulcer disease, possibly also secondary to constipation as she had some significant fecal loading on recent CAT scan  -Recommend famotidine 40 mg twice daily, she has allergy to omeprazole and as omeprazole  -We will proceed with an upper endoscopy to exclude peptic ulcer disease and biopsy H  pylori given her prior history  -Discussed with her risks of procedure including bleeding, surgery, perforation    2  Constipation: Seen on recent imaging studies  -Recommend 1 tablespoon of fiber supplement daily, 1 capful of MiraLAX daily  -Patient recently went to outpatient clinic had an enema and she reports improvement with that    3  Hepatic lesion: Appears to be hemangioma both on CAT scan and ultrasound, was seen similar in appearance in November 2022  -At this time no further follow-up needed, consider repeat imaging in 6 months to 1 year    Cornelius Donovan was seen today for abdominal pain, nausea and constipation  Diagnoses and all orders for this visit:    Hepatic lesion    Right upper quadrant pain    Family history of pancreatic cancer    Epigastric pain  -     famotidine (PEPCID) 40 MG tablet; Take 1 tablet (40 mg total) by mouth 2 (two) times a day  -     EGD; Future    Other orders  -     Diet NPO; Sips with meds; Standing  -     Void on call to OR; Standing            _____________________________________________________________        CC: Abdominal pain and abnormal imaging    HPI:  Hoyt Libman is a 47 y  o female who was referred for evaluation of abdominal pain and abnormal imaging  This is a pleasant and healthy 13-year-old female, she had a normal colonoscopy in 2021, the patient reports that recently over the past 4 to 6 months has had postprandial epigastric, right upper quadrant abdominal pain  Usually better after bowel movement, associated oftentimes with headaches and nausea  She denies any melena, rectal bleeding, vomiting  Due to worsening symptoms, and new change in bowel moods with softer stools she presented to the emergency room  There she had a normal CAT scan though there was a small lesion seen in her liver which was felt to be hemangioma, confirmed on follow-up ultrasound  She also had similar finding with a 1 7 cm hemangioma seen on the CT angiogram at outside hospital in November 2022  Patient has a remote history of H  pylori which she reports was diagnosed on endoscopy several years ago  She is otherwise healthy  Continues to have the above symptoms  She reports a longstanding history of constipation in the past which had improved after hysterectomy several years ago  Surgical history is notable for hysterectomy, cholecystectomy  Denies any tobacco, she drinks wine  She works in an office  Family history is notable for pancreatic cancer in her mother, there is a maternal uncle with stomach cancer, maternal aunts with breast cancer  Paternal aunt with stomach cancer and paternal uncle with pancreatic cancer  Patient denies any NSAID use  ROS:  The remainder of the ROS was negative except for the pertinent positives mentioned in HPI           Allergies: Esomeprazole and Omeprazole    Medications:   Current Outpatient Medications:   •  ascorbic acid (VITAMIN C) 1000 MG tablet, Take 1,000 mg by mouth daily, Disp: , Rfl:   •  Cholecalciferol 10 MCG (400 UNIT) CAPS, Take by mouth, Disp: , Rfl:   •  cyanocobalamin (VITAMIN B-12) 50 MCG tablet, Take 50 mcg by mouth daily, Disp: , Rfl: "  •  famotidine (PEPCID) 40 MG tablet, Take 1 tablet (40 mg total) by mouth 2 (two) times a day, Disp: 60 tablet, Rfl: 3  •  magnesium citrate oral solution, Take 296 mL by mouth, Disp: , Rfl:   •  multivitamin (THERAGRAN) TABS, Take 1 tablet by mouth daily, Disp: , Rfl: '    Past Medical History:   Diagnosis Date   • Anemia    • Anxiety    • Depression 2/17/2020   • History of transfusion 2017    due to vag bleeding   • Insomnia    • Kidney stone    • Mass of breast, left    • Mass of breast, right    • Seasonal allergies        Past Surgical History:   Procedure Laterality Date   • COLONOSCOPY  12/2021   • HYSTERECTOMY     • HYSTEROSCOPY     • KIDNEY STONE SURGERY     • LAPAROSCOPIC CHOLECYSTECTOMY     • MS CORRJ HALLUX VALGUS W/SESMDC W/DIST METAR OSTEOT Right 09/01/2020    Procedure: Pilar Mckeon;  Surgeon: David Russell DPM;  Location: [address] [place] [address];  Service: Podiatry   • TUBAL LIGATION         Family History   Problem Relation Age of Onset   • Hypertension Mother    • Pancreatic cancer Mother    • Liver cancer Mother    • Heart disease Father    • Breast cancer Maternal Aunt    • Pancreatic cancer Maternal Uncle    • Stomach cancer Paternal Uncle         reports that she has never smoked  She has never used smokeless tobacco  She reports that she does not currently use alcohol after a past usage of about 1 0 standard drink of alcohol per week  She reports that she does not use drugs            Physical Exam:     /76 (BP Location: Right arm, Patient Position: Sitting, Cuff Size: Standard)   Pulse 74   Ht 4' 10\" (1 473 m)   Wt 48 4 kg (106 lb 12 8 oz)   SpO2 98%   BMI 22 32 kg/m²     Gen: wn/wd, NAD  HEENT: anicteric, MMM, no cervical LAD  CVS: RRR, no m/r/g  CHEST: CTA b/l  ABD: +BS, soft, epigastric tenderness, no rebound or guarding, no hepatosplenomegaly  EXT: no c/c/e  NEURO: aaox3  SKIN: NO rashes    "

## 2023-06-21 NOTE — LETTER
June 21, 2023     Jaqueline Shaikh MD  [address] [place] [address]  [address] [facility] [address]  [address] [facility] [address]    Patient: Ethel Novoa   YOB: 1970   Date of Visit: 6/21/2023       Dear Dr Davon Rodriguez: Thank you for referring Ethel Novoa to me for evaluation  Below are my notes for this consultation  If you have questions, please do not hesitate to call me  I look forward to following your patient along with you  Sincerely,        Sandy Melara MD        CC: No Recipients    Sandy Melara MD  6/21/2023  8:45 AM  Sign when Signing Visit  Consultation - [facility]  Ethel Novoa 47 y o  female MRN: [id number]          Assessment & Plan:    Pleasant 62-year-old female with 4 to 6 months of abdominal pain, usually worse postprandially, associate with nausea and headaches  Strong family history pancreatic cancer, gastric cancer  Recent CT scan which was unremarkable except for hemangioma, confirmed on ultrasound  Normal laboratory studies  1   Epigastric abdominal pain: Differential including peptic ulcer disease, possibly also secondary to constipation as she had some significant fecal loading on recent CAT scan  -Recommend famotidine 40 mg twice daily, she has allergy to omeprazole and as omeprazole  -We will proceed with an upper endoscopy to exclude peptic ulcer disease and biopsy H  pylori given her prior history  -Discussed with her risks of procedure including bleeding, surgery, perforation    2  Constipation: Seen on recent imaging studies  -Recommend 1 tablespoon of fiber supplement daily, 1 capful of MiraLAX daily  -Patient recently went to outpatient clinic had an enema and she reports improvement with that    3    Hepatic lesion: Appears to be hemangioma both on CAT scan and ultrasound, was seen similar in appearance in November 2022  -At this time no further follow-up needed, consider repeat imaging in 6 months to 1 year    Angelia Monzon was seen today for abdominal pain, nausea and constipation  Diagnoses and all orders for this visit:    Hepatic lesion    Right upper quadrant pain    Family history of pancreatic cancer    Epigastric pain  -     famotidine (PEPCID) 40 MG tablet; Take 1 tablet (40 mg total) by mouth 2 (two) times a day  -     EGD; Future    Other orders  -     Diet NPO; Sips with meds; Standing  -     Void on call to OR; Standing            _____________________________________________________________        CC: Abdominal pain and abnormal imaging    HPI:  Thomas Montoya is a 47 y  o female who was referred for evaluation of abdominal pain and abnormal imaging  This is a pleasant and healthy 58-year-old female, she had a normal colonoscopy in 2021, the patient reports that recently over the past 4 to 6 months has had postprandial epigastric, right upper quadrant abdominal pain  Usually better after bowel movement, associated oftentimes with headaches and nausea  She denies any melena, rectal bleeding, vomiting  Due to worsening symptoms, and new change in bowel moods with softer stools she presented to the emergency room  There she had a normal CAT scan though there was a small lesion seen in her liver which was felt to be hemangioma, confirmed on follow-up ultrasound  She also had similar finding with a 1 7 cm hemangioma seen on the CT angiogram at outside hospital in November 2022  Patient has a remote history of H  pylori which she reports was diagnosed on endoscopy several years ago  She is otherwise healthy  Continues to have the above symptoms  She reports a longstanding history of constipation in the past which had improved after hysterectomy several years ago  Surgical history is notable for hysterectomy, cholecystectomy  Denies any tobacco, she drinks wine  She works in an office  Family history is notable for pancreatic cancer in her mother, there is a maternal uncle with stomach cancer, maternal aunts with breast cancer      Paternal aunt with stomach cancer and paternal uncle with pancreatic cancer  Patient denies any NSAID use  ROS:  The remainder of the ROS was negative except for the pertinent positives mentioned in HPI  Allergies: Esomeprazole and Omeprazole    Medications:   Current Outpatient Medications:   •  ascorbic acid (VITAMIN C) 1000 MG tablet, Take 1,000 mg by mouth daily, Disp: , Rfl:   •  Cholecalciferol 10 MCG (400 UNIT) CAPS, Take by mouth, Disp: , Rfl:   •  cyanocobalamin (VITAMIN B-12) 50 MCG tablet, Take 50 mcg by mouth daily, Disp: , Rfl:   •  famotidine (PEPCID) 40 MG tablet, Take 1 tablet (40 mg total) by mouth 2 (two) times a day, Disp: 60 tablet, Rfl: 3  •  magnesium citrate oral solution, Take 296 mL by mouth, Disp: , Rfl:   •  multivitamin (THERAGRAN) TABS, Take 1 tablet by mouth daily, Disp: , Rfl: '    Past Medical History:   Diagnosis Date   • Anemia    • Anxiety    • Depression 2/17/2020   • History of transfusion 2017    due to vag bleeding   • Insomnia    • Kidney stone    • Mass of breast, left    • Mass of breast, right    • Seasonal allergies        Past Surgical History:   Procedure Laterality Date   • COLONOSCOPY  12/2021   • HYSTERECTOMY     • HYSTEROSCOPY     • KIDNEY STONE SURGERY     • LAPAROSCOPIC CHOLECYSTECTOMY     • [place] CORRJ HALLUX VALGUS W/SESMDC W/DIST METAR OSTEOT Right 09/01/2020    Procedure: Kelli Woodall;  Surgeon: Venu Hogan DPM;  Location: [facility] MAIN OR;  Service: Podiatry   • TUBAL LIGATION         Family History   Problem Relation Age of Onset   • Hypertension Mother    • Pancreatic cancer Mother    • Liver cancer Mother    • Heart disease Father    • Breast cancer Maternal Aunt    • Pancreatic cancer Maternal Uncle    • Stomach cancer Paternal Uncle         reports that she has never smoked  She has never used smokeless tobacco  She reports that she does not currently use alcohol after a past usage of about 1 0 standard drink of alcohol per week   She reports that she "does not use drugs            Physical Exam:     /76 (BP Location: Right arm, Patient Position: Sitting, Cuff Size: Standard)   Pulse 74   Ht 4' 10\" (1 473 m)   Wt 48 4 kg (106 lb 12 8 oz)   SpO2 98%   BMI 22 32 kg/m²     Gen: wn/wd, NAD  HEENT: anicteric, MMM, no cervical LAD  CVS: RRR, no m/r/g  CHEST: CTA b/l  ABD: +BS, soft, epigastric tenderness, no rebound or guarding, no hepatosplenomegaly  EXT: no c/c/e  NEURO: aaox3  SKIN: NO rashes    "

## 2023-06-21 NOTE — PATIENT INSTRUCTIONS
1 tablespoon daily metamucil  1 capful of miralax daily Scheduled date of EGD(as of today):  08/14/23  Physician performing EGD:  dr Tahmina Wilburn  Location of EGD:[facility]  Instructions reviewed with patient by: shona  Clearances:

## 2023-06-23 ENCOUNTER — OFFICE VISIT (OUTPATIENT)
Dept: AUDIOLOGY | Age: 53
End: 2023-06-23
Payer: COMMERCIAL

## 2023-06-23 DIAGNOSIS — H90.3 SENSORY HEARING LOSS, BILATERAL: Primary | ICD-10-CM

## 2023-06-23 DIAGNOSIS — H91.91 DECREASED HEARING OF RIGHT EAR: ICD-10-CM

## 2023-06-23 PROCEDURE — 92557 COMPREHENSIVE HEARING TEST: CPT

## 2023-06-23 PROCEDURE — 92567 TYMPANOMETRY: CPT

## 2023-06-23 NOTE — PROGRESS NOTES
HEARING EVALUATION    Name:  Jennifer Navas  :  1970  Age:  47 y o  Date of Evaluation: 23     History: Difficulty Understanding  Reason for visit: Jennifer Navas is being seen today at the request of Dr Kishore Raza for an evaluation of hearing  Patient reports difficulty understanding  She has a longstanding right sided hearing loss and has noticed her left ear slowly declining  She notes some dizziness and noise exposure  She denies ear infections, a family history of hearing loss, and tinnitus  EVALUATION:    Otoscopic Evaluation:   Right Ear: Clear and healthy ear canal and tympanic membrane   Left Ear: Clear and healthy ear canal and tympanic membrane    Tympanometry:   Right: Type A - normal middle ear pressure and compliance   Left: Type A - normal middle ear pressure and compliance    Audiogram Results:  Pure tone testing revealed a mild sloping to moderately severe sensorineural hearing loss in the  left  ear and a moderate sloping to profound sensorineural hearing loss in the  right ear  SRT and PTA are in agreement indicating good test reliability  Word recognition scores were excellent in the left and poor in the right  She noted some dizziness the presenting at 110 dB at 3000 Hz in the right ear, but did not hear any beeps  *see attached audiogram      RECOMMENDATIONS:  Annual hearing eval, Consult ENT, Return to [facility]  for F/U, Hearing Aid Evaluation and Copy to Patient/Caregiver    PATIENT EDUCATION:   Discussed results and recommendations with Bart Ramírez  Hearing aids were briefly discussed  A cochlear implant was also discussed  OVR paperwork was provided  Questions were addressed and the patient was encouraged to contact our department should concerns arise  Juan José Graff    Clinical Audiologist

## 2023-08-14 ENCOUNTER — HOSPITAL ENCOUNTER (OUTPATIENT)
Dept: GASTROENTEROLOGY | Facility: AMBULARY SURGERY CENTER | Age: 53
Setting detail: OUTPATIENT SURGERY
Discharge: HOME | End: 2023-08-14
Attending: INTERNAL MEDICINE
Payer: COMMERCIAL

## 2023-08-14 ENCOUNTER — ANESTHESIA EVENT (OUTPATIENT)
Dept: GASTROENTEROLOGY | Facility: AMBULARY SURGERY CENTER | Age: 53
End: 2023-08-14

## 2023-08-14 ENCOUNTER — ANESTHESIA (OUTPATIENT)
Dept: GASTROENTEROLOGY | Facility: AMBULARY SURGERY CENTER | Age: 53
End: 2023-08-14

## 2023-08-14 VITALS
RESPIRATION RATE: 17 BRPM | HEIGHT: 58 IN | BODY MASS INDEX: 22.46 KG/M2 | WEIGHT: 107 LBS | TEMPERATURE: 97.1 F | OXYGEN SATURATION: 100 % | SYSTOLIC BLOOD PRESSURE: 108 MMHG | DIASTOLIC BLOOD PRESSURE: 63 MMHG | HEART RATE: 60 BPM

## 2023-08-14 DIAGNOSIS — R10.13 EPIGASTRIC PAIN: ICD-10-CM

## 2023-08-14 PROCEDURE — 43239 EGD BIOPSY SINGLE/MULTIPLE: CPT | Performed by: INTERNAL MEDICINE

## 2023-08-14 PROCEDURE — 88305 TISSUE EXAM BY PATHOLOGIST: CPT | Performed by: PATHOLOGY

## 2023-08-14 RX ORDER — SODIUM CHLORIDE, SODIUM LACTATE, POTASSIUM CHLORIDE, CALCIUM CHLORIDE 600; 310; 30; 20 MG/100ML; MG/100ML; MG/100ML; MG/100ML
INJECTION, SOLUTION INTRAVENOUS CONTINUOUS PRN
Status: DISCONTINUED | OUTPATIENT
Start: 2023-08-14 | End: 2023-08-14

## 2023-08-14 RX ORDER — PROPOFOL 10 MG/ML
INJECTION, EMULSION INTRAVENOUS AS NEEDED
Status: DISCONTINUED | OUTPATIENT
Start: 2023-08-14 | End: 2023-08-14

## 2023-08-14 RX ORDER — LIDOCAINE HYDROCHLORIDE 10 MG/ML
INJECTION, SOLUTION EPIDURAL; INFILTRATION; INTRACAUDAL; PERINEURAL AS NEEDED
Status: DISCONTINUED | OUTPATIENT
Start: 2023-08-14 | End: 2023-08-14

## 2023-08-14 RX ADMIN — PROPOFOL 50 MG: 10 INJECTION, EMULSION INTRAVENOUS at 13:43

## 2023-08-14 RX ADMIN — PROPOFOL 100 MG: 10 INJECTION, EMULSION INTRAVENOUS at 13:41

## 2023-08-14 RX ADMIN — SODIUM CHLORIDE, SODIUM LACTATE, POTASSIUM CHLORIDE, AND CALCIUM CHLORIDE: .6; .31; .03; .02 INJECTION, SOLUTION INTRAVENOUS at 13:39

## 2023-08-14 RX ADMIN — LIDOCAINE HYDROCHLORIDE 100 MG: 10 INJECTION, SOLUTION EPIDURAL; INFILTRATION; INTRACAUDAL at 13:41

## 2023-08-14 NOTE — ANESTHESIA PREPROCEDURE EVALUATION
Procedure:  EGD    Relevant Problems   GI/HEPATIC   (+) Hepatic lesion      MUSCULOSKELETAL   (+) Fibromyalgia      NEURO/PSYCH   (+) Anxiety disorder   (+) Chronic right shoulder pain   (+) Fibromyalgia        Physical Exam    Airway    Mallampati score: II  TM Distance: >3 FB  Neck ROM: full     Dental   No notable dental hx     Cardiovascular  Rhythm: regular, Rate: normal, Cardiovascular exam normal    Pulmonary  Pulmonary exam normal Breath sounds clear to auscultation,     Other Findings        Anesthesia Plan  ASA Score- 2     Anesthesia Type- IV sedation with anesthesia with ASA Monitors. Additional Monitors:   Airway Plan:     Comment: Discussed risks/benefits, including medication reactions, awareness, aspiration, and serious/life threatening complications. Plan to maintain native airway with IVGA, monitored with EtCO2. Plan Factors-Exercise tolerance (METS): >4 METS. Patient summary reviewed. Patient instructed to abstain from smoking on day of procedure. Patient did not smoke on day of surgery. Induction- intravenous. Postoperative Plan-     Informed Consent- Anesthetic plan and risks discussed with patient. I personally reviewed this patient with the CRNA. Discussed and agreed on the Anesthesia Plan with the CRNA. Asif Ulrich

## 2023-08-14 NOTE — ANESTHESIA POSTPROCEDURE EVALUATION
Post-Op Assessment Note    CV Status:  Stable  Pain Score: 0    Pain management: adequate     Mental Status:  Alert and awake   Hydration Status:  Euvolemic   PONV Controlled:  Controlled   Airway Patency:  Patent      Post Op Vitals Reviewed: Yes      Staff: Anesthesiologist, CRNA         There were no known notable events for this encounter.     BP 99/56 (08/14/23 1355)    Temp (!) 97.1 °F (36.2 °C) (08/14/23 1355)    Pulse 63 (08/14/23 1355)   Resp 14 (08/14/23 1355)    SpO2 96 % (08/14/23 1355)

## 2023-08-14 NOTE — H&P
History and Physical - [facility]  Inés Vinson 47 y.o. female MRN: [id number]    HPI: Inés Vinson is a 47y.o. year old female who presents with epigastric pain.        Review of Systems    Historical Information   Past Medical History:   Diagnosis Date   • Anemia    • Anxiety    • Depression 2/17/2020   • History of transfusion 2017    due to vag bleeding   • Insomnia    • Kidney stone    • Mass of breast, left    • Mass of breast, right    • Seasonal allergies      Past Surgical History:   Procedure Laterality Date   • COLONOSCOPY  12/2021   • EGD N/A    • HYSTERECTOMY     • HYSTEROSCOPY     • KIDNEY STONE SURGERY     • LAPAROSCOPIC CHOLECYSTECTOMY     • CO CORRJ HALLUX VALGUS W/SESMDC W/DIST METAR OSTEOT Right 09/01/2020    Procedure: Clearance ;  Surgeon: Elizabeth Reece DPM;  Location:  MAIN OR;  Service: Podiatry   • TUBAL LIGATION       Social History   Social History     Substance and Sexual Activity   Alcohol Use Not Currently   • Alcohol/week: 1.0 standard drink of alcohol   • Types: 1 Glasses of wine per week    Comment: occasional     Social History     Substance and Sexual Activity   Drug Use Never     Social History     Tobacco Use   Smoking Status Never   Smokeless Tobacco Never     Family History   Problem Relation Age of Onset   • Hypertension Mother    • Pancreatic cancer Mother    • Liver cancer Mother    • Heart disease Father    • Breast cancer Maternal Aunt    • Pancreatic cancer Maternal Uncle    • Stomach cancer Paternal Uncle        Meds/Allergies     (Not in a hospital admission)      Allergies   Allergen Reactions   • Esomeprazole Diarrhea and GI Intolerance   • Omeprazole GI Intolerance       Objective     /70   Pulse 63   Temp (!) 97.1 °F (36.2 °C) (Temporal)   Resp 12   Ht 4' 10" (1.473 m)   Wt 48.5 kg (107 lb)   SpO2 100%   BMI 22.36 kg/m²       PHYSICAL EXAM    Gen: NAD  CV: RRR  CHEST: Clear  ABD: soft, NT/ND  EXT: no edema  Neuro: AAO      ASSESSMENT/PLAN:  This is a 47y.o. year old female here for  epigastric pain.        PLAN:   Procedure: Marnie Cordova

## 2023-08-17 PROCEDURE — 88305 TISSUE EXAM BY PATHOLOGIST: CPT | Performed by: PATHOLOGY

## 2023-08-22 DIAGNOSIS — R10.13 EPIGASTRIC PAIN: Primary | ICD-10-CM

## 2023-08-22 RX ORDER — SUCRALFATE 1 G/1
1 TABLET ORAL
Qty: 90 TABLET | Refills: 2 | Status: SHIPPED | OUTPATIENT
Start: 2023-08-22

## 2023-09-11 ENCOUNTER — TELEPHONE (OUTPATIENT)
Dept: FAMILY MEDICINE CLINIC | Facility: CLINIC | Age: 53
End: 2023-09-11

## 2023-09-11 DIAGNOSIS — R68.83 CHILLS: ICD-10-CM

## 2023-09-11 DIAGNOSIS — R61 NIGHT SWEATS: ICD-10-CM

## 2023-09-11 DIAGNOSIS — B00.1 COLD SORE: Primary | ICD-10-CM

## 2023-09-11 DIAGNOSIS — R11.0 NAUSEA: ICD-10-CM

## 2023-09-11 RX ORDER — ONDANSETRON 4 MG/1
4 TABLET, FILM COATED ORAL EVERY 8 HOURS PRN
Qty: 20 TABLET | Refills: 0 | Status: SHIPPED | OUTPATIENT
Start: 2023-09-11

## 2023-09-11 RX ORDER — VALACYCLOVIR HYDROCHLORIDE 1 G/1
1000 TABLET, FILM COATED ORAL 2 TIMES DAILY
Qty: 10 TABLET | Refills: 0 | Status: SHIPPED | OUTPATIENT
Start: 2023-09-11 | End: 2023-09-16

## 2023-09-11 NOTE — TELEPHONE ENCOUNTER
Pt called stated she been experiencing body pain, chills, diarrhea, fever and pt may think she have herpes around her mouth pt also stated all of her symptoms started two weeks ago she would like any medication please advised thank you

## 2023-09-11 NOTE — TELEPHONE ENCOUNTER
X2 weeks ago started, sweats at night, getting up and having to change due to damps, chills and cold but hot inside, headache during the day, comes and goes, herpes outside lips, bodyaches, diarrhea not watery mushy, feels weak, hungry but can't eat but nausea, took mucinexmax strength felt a little better, throat hurts, glands are swollen, home COVID test was negative, recommend tylenol [id number], start valtrex BID x 5 days, zofran as needed, check labs and follow-up in office if not better by the end of this week. Time spent on phone 10 minutes.

## 2024-01-05 ENCOUNTER — HOSPITAL ENCOUNTER (EMERGENCY)
Facility: HOSPITAL | Age: 54
Discharge: HOME | End: 2024-01-05
Attending: EMERGENCY MEDICINE
Payer: COMMERCIAL

## 2024-01-05 ENCOUNTER — APPOINTMENT (EMERGENCY)
Dept: RADIOLOGY | Facility: HOSPITAL | Age: 54
End: 2024-01-05
Payer: COMMERCIAL

## 2024-01-05 VITALS
RESPIRATION RATE: 20 BRPM | HEART RATE: 75 BPM | DIASTOLIC BLOOD PRESSURE: 81 MMHG | WEIGHT: 104.72 LBS | BODY MASS INDEX: 21.89 KG/M2 | TEMPERATURE: 98.6 F | SYSTOLIC BLOOD PRESSURE: 141 MMHG | OXYGEN SATURATION: 99 %

## 2024-01-05 DIAGNOSIS — M54.50 ACUTE BILATERAL LOW BACK PAIN WITHOUT SCIATICA: Primary | ICD-10-CM

## 2024-01-05 PROCEDURE — 99284 EMERGENCY DEPT VISIT MOD MDM: CPT

## 2024-01-05 PROCEDURE — 96372 THER/PROPH/DIAG INJ SC/IM: CPT

## 2024-01-05 PROCEDURE — 99284 EMERGENCY DEPT VISIT MOD MDM: CPT | Performed by: EMERGENCY MEDICINE

## 2024-01-05 RX ORDER — KETOROLAC TROMETHAMINE 30 MG/ML
15 INJECTION, SOLUTION INTRAMUSCULAR; INTRAVENOUS ONCE
Status: COMPLETED | OUTPATIENT
Start: 2024-01-05 | End: 2024-01-05

## 2024-01-05 RX ORDER — METHOCARBAMOL 500 MG/1
500 TABLET, FILM COATED ORAL ONCE
Status: COMPLETED | OUTPATIENT
Start: 2024-01-05 | End: 2024-01-05

## 2024-01-05 RX ORDER — METHOCARBAMOL 500 MG/1
500 TABLET, FILM COATED ORAL EVERY 6 HOURS PRN
Status: DISCONTINUED | OUTPATIENT
Start: 2024-01-05 | End: 2024-01-05

## 2024-01-05 RX ORDER — METHOCARBAMOL 500 MG/1
500 TABLET, FILM COATED ORAL 2 TIMES DAILY
Qty: 10 TABLET | Refills: 0 | Status: SHIPPED | OUTPATIENT
Start: 2024-01-05 | End: 2024-01-10

## 2024-01-05 RX ADMIN — METHOCARBAMOL TABLETS 500 MG: 500 TABLET, COATED ORAL at 15:42

## 2024-01-05 RX ADMIN — KETOROLAC TROMETHAMINE 15 MG: 30 INJECTION, SOLUTION INTRAMUSCULAR; INTRAVENOUS at 15:43

## 2024-01-05 NOTE — ED ATTENDING ATTESTATION
1/5/2024  IAnna, , saw and evaluated the patient. I have discussed the patient with the resident/non-physician practitioner and agree with the resident's/non-physician practitioner's findings, Plan of Care, and MDM as documented in the resident's/non-physician practitioner's note, except where noted. All available labs and Radiology studies were reviewed.  I was present for key portions of any procedure(s) performed by the resident/non-physician practitioner and I was immediately available to provide assistance.       At this point I agree with the current assessment done in the Emergency Department.  I have conducted an independent evaluation of this patient a history and physical is as follows:53y F here for evaluation of 3d of bilateral lower back pain.  Woke w/ pain, doesn't recall any specific injury or inciting event. No hx of back pain in the past.  No radiation of pain, no b/b incont, no saddle/perineal anesthesia, no extremity  numbness or weakness. Exam WNWD nad, mmm, neck supple, resp non-labored, cv regular rate, abd nd, ext no cce, skin dry, neuro non-focal, normal mood. A/P MSK LBP, no red flags, will treat symptomatically      ED Course         Critical Care Time  Procedures    1. Acute bilateral low back pain without sciatica  Ambulatory Referral to [facility]    methocarbamol (ROBAXIN) 500 mg tablet        Time reflects when diagnosis was documented in both MDM as applicable and the Disposition within this note       Time User Action Codes Description Comment    1/5/2024  3:16 PM Donna Minor Add [M54.50] Acute bilateral low back pain without sciatica           ED Disposition       ED Disposition   Discharge    Condition   Stable    Date/Time   Fri Jan 5, 2024  3:25 PM    Comment   Alla Capone discharge to home/self care.                   Follow-up Information       Follow up With Specialties Details Why Contact Info    Porsha Bates PA-C Physician  Assistant, Family Medicine Call in 2 days  [address] [place] [address]   [address]  [place] [address] [phone]

## 2024-01-05 NOTE — ED PROVIDER NOTES
History  Chief Complaint   Patient presents with    Back Pain     B/L lower back pain X 3 days. Also c/o constipation.      52 yo female with PMH anemia, anxiety, depression, kidney stone, insomnia, seasonal allergies, breast mass.  C/O lumbar back pain, bilateral, severe, started 3 days ago treating with allieve and warm + cold compressess.  Pain is worse in the morning and improves with spine flexion + walking , improves with activity.  Denies saddle anesthesia, incontinence,trauma,hx of cancer she woke up one day with the pain.  She was also constipated she used enema yesterday.    Px refused Xray   Muscle relxnt and Toradol ordered.            Prior to Admission Medications   Prescriptions Last Dose Informant Patient Reported? Taking?   Cholecalciferol 10 MCG (400 UNIT) CAPS   Yes No   Sig: Take by mouth   ascorbic acid (VITAMIN C) 1000 MG tablet   Yes No   Sig: Take 1,000 mg by mouth daily   cyanocobalamin (VITAMIN B-12) 50 MCG tablet   Yes No   Sig: Take 50 mcg by mouth daily   famotidine (PEPCID) 40 MG tablet   No No   Sig: Take 1 tablet (40 mg total) by mouth 2 (two) times a day   magnesium citrate oral solution   Yes No   Sig: Take 296 mL by mouth   multivitamin (THERAGRAN) TABS   Yes No   Sig: Take 1 tablet by mouth daily   ondansetron (ZOFRAN) 4 mg tablet   No No   Sig: Take 1 tablet (4 mg total) by mouth every 8 (eight) hours as needed for nausea or vomiting   sucralfate (CARAFATE) 1 g tablet   No No   Sig: Take 1 tablet (1 g total) by mouth 3 (three) times a day before meals   valACYclovir (VALTREX) 1,000 mg tablet   No No   Sig: Take 1 tablet (1,000 mg total) by mouth 2 (two) times a day for 5 days      Facility-Administered Medications: None       Past Medical History:   Diagnosis Date    Anemia     Anxiety     Depression 2/17/2020    History of transfusion 2017    due to vag bleeding    Insomnia     Kidney stone     Mass of breast, left     Mass of breast, right     Seasonal allergies        Past  Surgical History:   Procedure Laterality Date    COLONOSCOPY  12/2021    EGD N/A     HYSTERECTOMY      HYSTEROSCOPY      KIDNEY STONE SURGERY      LAPAROSCOPIC CHOLECYSTECTOMY      WI CORRJ HLX VLGS BNCTY SESMDC DSTL METAR OSTEOT Right 09/01/2020    Procedure: BUNIONECTOMY OLIVIA;  Surgeon: Richardson Stevens DPM;  Location:  MAIN OR;  Service: Podiatry    TUBAL LIGATION         Family History   Problem Relation Age of Onset    Hypertension Mother     Pancreatic cancer Mother     Liver cancer Mother     Heart disease Father     Breast cancer Maternal Aunt     Pancreatic cancer Maternal Uncle     Stomach cancer Paternal Uncle      I have reviewed and agree with the history as documented.    E-Cigarette/Vaping    E-Cigarette Use Never User      E-Cigarette/Vaping Substances    Nicotine No     THC No     CBD No     Flavoring No     Other No      Social History     Tobacco Use    Smoking status: Never    Smokeless tobacco: Never   Vaping Use    Vaping status: Never Used   Substance Use Topics    Alcohol use: Not Currently     Alcohol/week: 1.0 standard drink of alcohol     Types: 1 Glasses of wine per week     Comment: occasional    Drug use: Never        Review of Systems   Constitutional:  Positive for activity change. Negative for appetite change, chills, diaphoresis, fatigue, fever and unexpected weight change.   HENT:  Negative for congestion, facial swelling, rhinorrhea, sinus pressure, sinus pain and sneezing.    Eyes:  Negative for pain, discharge, redness and itching.   Respiratory: Negative.     Cardiovascular: Negative.    Gastrointestinal:  Positive for constipation.   Endocrine: Negative for cold intolerance, heat intolerance, polydipsia, polyphagia and polyuria.   Genitourinary: Negative.  Negative for decreased urine volume, difficulty urinating, dysuria, flank pain, frequency and pelvic pain.   Musculoskeletal:  Positive for back pain.        Lumbar   Skin: Negative.  Negative for color change, pallor,  rash and wound.   Allergic/Immunologic: Negative.    Neurological: Negative.  Negative for dizziness, tremors, syncope, facial asymmetry, weakness, numbness and headaches.   Hematological: Negative.    Psychiatric/Behavioral: Negative.  Negative for agitation, behavioral problems, confusion and hallucinations. The patient is not nervous/anxious and is not hyperactive.        Physical Exam  ED Triage Vitals [01/05/24 1435]   Temperature Pulse Respirations Blood Pressure SpO2   98.6 °F (37 °C) 75 20 141/81 99 %      Temp Source Heart Rate Source Patient Position - Orthostatic VS BP Location FiO2 (%)   Tympanic Monitor Lying Left arm --      Pain Score       --             Orthostatic Vital Signs  Vitals:    01/05/24 1435   BP: 141/81   Pulse: 75   Patient Position - Orthostatic VS: Lying       Physical Exam  Vitals and nursing note reviewed.   Constitutional:       General: She is not in acute distress.     Appearance: Normal appearance. She is well-developed. She is not ill-appearing, toxic-appearing or diaphoretic.   HENT:      Head: Normocephalic and atraumatic.      Right Ear: External ear normal.      Left Ear: External ear normal.      Nose: Nose normal.      Mouth/Throat:      Mouth: Mucous membranes are moist.   Eyes:      General: No scleral icterus.        Right eye: No discharge.         Left eye: No discharge.      Conjunctiva/sclera: Conjunctivae normal.   Neck:      Vascular: No carotid bruit.   Cardiovascular:      Rate and Rhythm: Normal rate and regular rhythm.      Pulses: Normal pulses.      Heart sounds: Normal heart sounds. No murmur heard.  Pulmonary:      Effort: Pulmonary effort is normal. No respiratory distress.      Breath sounds: Normal breath sounds. No wheezing.   Abdominal:      General: Abdomen is flat.      Palpations: Abdomen is soft.      Tenderness: There is no abdominal tenderness.   Musculoskeletal:         General: Tenderness present. No swelling or deformity. Normal range of  motion.      Cervical back: Normal range of motion and neck supple. No rigidity or tenderness.      Right lower leg: No edema.      Left lower leg: No edema.      Comments: Paraspinal muscle tenderness bilateral   Lymphadenopathy:      Cervical: No cervical adenopathy.   Skin:     General: Skin is warm and dry.      Capillary Refill: Capillary refill takes less than 2 seconds.   Neurological:      General: No focal deficit present.      Mental Status: She is alert and oriented to person, place, and time.      Cranial Nerves: No cranial nerve deficit.      Sensory: No sensory deficit.      Motor: No weakness.      Coordination: Coordination normal.      Gait: Gait normal.      Deep Tendon Reflexes: Reflexes normal.   Psychiatric:         Mood and Affect: Mood normal.         Behavior: Behavior normal.         ED Medications  Medications - No data to display    Diagnostic Studies  Results Reviewed       None                   No orders to display         Procedures  Procedures      ED Course                             SBIRT 22yo+      Flowsheet Row Most Recent Value   Initial Alcohol Screen: US AUDIT-C     1. How often do you have a drink containing alcohol? 0 Filed at: 01/05/2024 1436   2. How many drinks containing alcohol do you have on a typical day you are drinking?  0 Filed at: 01/05/2024 1436   3b. FEMALE Any Age, or MALE 65+: How often do you have 4 or more drinks on one occassion? 0 Filed at: 01/05/2024 1436   Audit-C Score 0 Filed at: 01/05/2024 1436   DEMOND: How many times in the past year have you...    Used an illegal drug or used a prescription medication for non-medical reasons? Never Filed at: 01/05/2024 1436                  Medical Decision Making  Amount and/or Complexity of Data Reviewed  Radiology: ordered.    Risk  Prescription drug management.          Disposition  Final diagnoses:   None     ED Disposition       None          Follow-up Information    None         Patient's Medications    Discharge Prescriptions    No medications on file     No discharge procedures on file.    PDMP Review       None             ED Provider  Attending physically available and evaluated Alla Capone. I managed the patient along with the ED Attending.    Electronically Signed by           Donna Minor MD  01/05/24 0755

## 2024-01-09 ENCOUNTER — TELEPHONE (OUTPATIENT)
Dept: PHYSICAL THERAPY | Facility: OTHER | Age: 54
End: 2024-01-09

## 2024-01-09 NOTE — TELEPHONE ENCOUNTER
Call placed to the patient per [facility] referral.    Patient did not answer at the time the call ws placed. Unable to LM, the mailbox ws full    This is the 1st attempt to reach the patient.  Will defer per protocol.

## 2024-01-15 NOTE — TELEPHONE ENCOUNTER
"Call placed to the patient per [facility] referral.    Unable to leave a message \" the mailbox is full\".     This is the 2nd attempt to reach the patient. Will defer per protocol.  "

## 2024-08-02 ENCOUNTER — TELEPHONE (OUTPATIENT)
Dept: FAMILY MEDICINE CLINIC | Facility: CLINIC | Age: 54
End: 2024-08-02

## 2024-12-03 ENCOUNTER — OFFICE VISIT (OUTPATIENT)
Dept: FAMILY MEDICINE CLINIC | Facility: CLINIC | Age: 54
End: 2024-12-03
Payer: COMMERCIAL

## 2024-12-03 VITALS
HEIGHT: 57 IN | SYSTOLIC BLOOD PRESSURE: 102 MMHG | HEART RATE: 88 BPM | BODY MASS INDEX: 23.64 KG/M2 | RESPIRATION RATE: 16 BRPM | TEMPERATURE: 98.2 F | WEIGHT: 109.6 LBS | OXYGEN SATURATION: 99 % | DIASTOLIC BLOOD PRESSURE: 70 MMHG

## 2024-12-03 DIAGNOSIS — N95.2 ATROPHIC VAGINITIS: ICD-10-CM

## 2024-12-03 DIAGNOSIS — R53.83 FATIGUE, UNSPECIFIED TYPE: ICD-10-CM

## 2024-12-03 DIAGNOSIS — R10.11 RIGHT UPPER QUADRANT PAIN: ICD-10-CM

## 2024-12-03 DIAGNOSIS — Z00.00 ANNUAL PHYSICAL EXAM: Primary | ICD-10-CM

## 2024-12-03 PROBLEM — G89.29 CHRONIC RIGHT SHOULDER PAIN: Status: RESOLVED | Noted: 2021-09-20 | Resolved: 2024-12-03

## 2024-12-03 PROBLEM — M25.511 CHRONIC RIGHT SHOULDER PAIN: Status: RESOLVED | Noted: 2021-09-20 | Resolved: 2024-12-03

## 2024-12-03 PROBLEM — R10.13 EPIGASTRIC PAIN: Status: RESOLVED | Noted: 2021-09-20 | Resolved: 2024-12-03

## 2024-12-03 PROBLEM — D18.03 LIVER HEMANGIOMA: Status: ACTIVE | Noted: 2023-05-23

## 2024-12-03 PROBLEM — Z86.59 HISTORY OF DEPRESSION: Status: RESOLVED | Noted: 2020-02-17 | Resolved: 2024-12-03

## 2024-12-03 PROCEDURE — 99214 OFFICE O/P EST MOD 30 MIN: CPT | Performed by: PHYSICIAN ASSISTANT

## 2024-12-03 PROCEDURE — 99396 PREV VISIT EST AGE 40-64: CPT | Performed by: PHYSICIAN ASSISTANT

## 2024-12-03 NOTE — ASSESSMENT & PLAN NOTE
Overall improved, check labs as requested by patient.    Orders:    Vitamin D 25 hydroxy; Future    Magnesium; Future

## 2024-12-03 NOTE — ASSESSMENT & PLAN NOTE
Epigastric pain resolved, normal EGD, no longer with H. pylori. Still with intermittent right upper quadrant pain improved since last year, occurs after eating.  Ultrasound abdomen in 6/2023 normal besides 1.6 cm hemangioma of the liver, benign.  With history of cholecystectomy, follow-up GI.

## 2024-12-03 NOTE — PROGRESS NOTES
Adult Annual Physical  Name: Alla Capone      : 1970      MRN: [id number]  Encounter Provider: Porsha Bates PA-C  Encounter Date: 12/3/2024   Encounter department: [facility]    Assessment & Plan  Annual physical exam         Right upper quadrant pain  Epigastric pain resolved, normal EGD, no longer with H. pylori. Still with intermittent right upper quadrant pain improved since last year, occurs after eating.  Ultrasound abdomen in 2023 normal besides 1.6 cm hemangioma of the liver, benign.  With history of cholecystectomy, follow-up GI.         Atrophic vaginitis  Never started estradiol vaginal cream, patient concerned with history of cancer.  Recommend follow-up with GYN.  Discussed risk, benefits, alternatives to phytoestrogen foods and coconut oil as needed.          Fatigue, unspecified type  Overall improved, check labs as requested by patient.    Orders:    Vitamin D 25 hydroxy; Future    Magnesium; Future    Immunizations and preventive care screenings were discussed with patient today. Appropriate education was printed on patient's after visit summary.    Counseling:  Alcohol/drug use: discussed moderation in alcohol intake, the recommendations for healthy alcohol use, and avoidance of illicit drug use.  Dental Health: discussed importance of regular tooth brushing, flossing, and dental visits.  Injury prevention: discussed safety/seat belts, safety helmets, smoke detectors, carbon monoxide detectors, and smoking near bedding or upholstery.  Sexual health: discussed sexually transmitted diseases, partner selection, use of condoms, avoidance of unintended pregnancy, and contraceptive alternatives.  Exercise: the importance of regular exercise/physical activity was discussed. Recommend exercise 3-5 times per week for at least 30 minutes.          History of Present Illness     Adult Annual Physical:  Patient presents for annual physical. Alla is a 54  "y.o. female with a h/o cholecystectomy, hysterectomy, hearing loss who presents for routine annual physical with concerns for ongoing RUQ pain, has regular soft bowel movements daily. RUQ less severe, after eating will feel like a sharp pain almost like a \"side stitch.\" Does not have pain currently, feels much different than gastritis pain which resolved after h pylori. Eating well, prefers to find natural remedies. Went to GYN but had normal US, was not able to complete vaginal transducer due to dryness and pain.     Mammogram scheduled 5/2025  Colonoscopy done in 2021    History was conducted in Georgian without the use of .  .     Diet and Physical Activity:  - Diet/Nutrition: well balanced diet.  - Exercise: walking.    Depression Screening:  - PHQ-2 Score: 0    General Health:  - Sleep: sleeps well.  - Hearing: decreased hearing bilateral ears.  - Vision: no vision problems.  - Dental: regular dental visits.    /GYN Health:  - Follows with GYN: yes.   - Menopause: postmenopausal.   - History of STDs: no  - Contraception: menopause.      Advanced Care Planning:  - Has an advanced directive?: no    - Has a durable medical POA?: no    - ACP document given to patient?: no      Review of Systems   Constitutional:  Negative for chills and fever.   HENT:  Negative for ear pain and sore throat.    Eyes:  Negative for pain and visual disturbance.   Respiratory:  Negative for cough and shortness of breath.    Cardiovascular:  Negative for chest pain and palpitations.   Gastrointestinal:  Negative for abdominal pain and vomiting.   Genitourinary:  Negative for dysuria and hematuria.   Musculoskeletal:  Negative for arthralgias and back pain.   Skin:  Negative for color change and rash.   Neurological:  Negative for seizures and syncope.   All other systems reviewed and are negative.        Objective   /70 (BP Location: Left arm, Patient Position: Sitting, Cuff Size: Standard)   Pulse 88   Temp 98.2 °F " "(36.8 °C) (Tympanic)   Resp 16   Ht 4' 9.2\" (1.453 m)   Wt 49.7 kg (109 lb 9.6 oz)   SpO2 99%   BMI 23.55 kg/m²     Physical Exam  Vitals and nursing note reviewed.   Constitutional:       General: She is not in acute distress.     Appearance: She is well-developed.   HENT:      Head: Normocephalic and atraumatic.      Right Ear: Tympanic membrane, ear canal and external ear normal.      Left Ear: Tympanic membrane, ear canal and external ear normal.      Mouth/Throat:      Mouth: Mucous membranes are moist.      Comments: Left tonsil stone  Eyes:      Extraocular Movements: Extraocular movements intact.      Conjunctiva/sclera: Conjunctivae normal.      Pupils: Pupils are equal, round, and reactive to light.   Cardiovascular:      Rate and Rhythm: Normal rate and regular rhythm.      Heart sounds: No murmur heard.  Pulmonary:      Effort: Pulmonary effort is normal. No respiratory distress.      Breath sounds: Normal breath sounds.   Abdominal:      Palpations: Abdomen is soft.      Tenderness: There is no abdominal tenderness.   Musculoskeletal:         General: No swelling.      Cervical back: Neck supple.   Skin:     General: Skin is warm and dry.      Capillary Refill: Capillary refill takes less than 2 seconds.   Neurological:      Mental Status: She is alert.   Psychiatric:         Mood and Affect: Mood normal.         "

## 2024-12-03 NOTE — ASSESSMENT & PLAN NOTE
Never started estradiol vaginal cream, patient concerned with history of cancer.  Recommend follow-up with GYN.  Discussed risk, benefits, alternatives to phytoestrogen foods and coconut oil as needed.

## 2025-01-07 ENCOUNTER — OFFICE VISIT (OUTPATIENT)
Dept: FAMILY MEDICINE CLINIC | Facility: CLINIC | Age: 55
End: 2025-01-07
Payer: COMMERCIAL

## 2025-01-07 VITALS
SYSTOLIC BLOOD PRESSURE: 112 MMHG | HEIGHT: 57 IN | DIASTOLIC BLOOD PRESSURE: 69 MMHG | WEIGHT: 108 LBS | TEMPERATURE: 97.5 F | RESPIRATION RATE: 16 BRPM | OXYGEN SATURATION: 98 % | BODY MASS INDEX: 23.3 KG/M2 | HEART RATE: 86 BPM

## 2025-01-07 DIAGNOSIS — R19.7 DIARRHEA, UNSPECIFIED TYPE: ICD-10-CM

## 2025-01-07 DIAGNOSIS — G44.209 ACUTE NON INTRACTABLE TENSION-TYPE HEADACHE: ICD-10-CM

## 2025-01-07 DIAGNOSIS — R05.1 ACUTE COUGH: ICD-10-CM

## 2025-01-07 DIAGNOSIS — R09.81 NASAL CONGESTION: ICD-10-CM

## 2025-01-07 DIAGNOSIS — R50.9 CHILLS WITH FEVER: ICD-10-CM

## 2025-01-07 DIAGNOSIS — R68.89 FLU-LIKE SYMPTOMS: Primary | ICD-10-CM

## 2025-01-07 PROCEDURE — 99214 OFFICE O/P EST MOD 30 MIN: CPT | Performed by: PHYSICIAN ASSISTANT

## 2025-01-07 PROCEDURE — 87636 SARSCOV2 & INF A&B AMP PRB: CPT | Performed by: PHYSICIAN ASSISTANT

## 2025-01-07 NOTE — PROGRESS NOTES
Name: Alla Capone      : 1970      MRN: [id number]  Encounter Provider: Porsha Bates PA-C  Encounter Date: 2025   Encounter department: [facility]  :  Assessment & Plan  Flu-like symptoms  X4 days, swab for COVID and flu, recommend rest, hydration, continue home remedies.  No alert symptoms.  Orders:  •  Covid/Flu- Office Collect Normal; Future    Acute cough  Mainly dry, not taking any over-the-counter cough suppressants, prefers natural remedies.  Follow-up if no further improvement.  Return to work note on Thursday.       Diarrhea, unspecified type  Suspect viral, no recent travel, soft stools, using liquid IV, continue fluid hydration.       Acute non intractable tension-type headache  Recommend Tylenol as needed.  Rest, hydration.       Chills with fever  Low-grade 99F Tmax, recommend Tylenol as needed for fever.  Suspect due to viral URI.       Nasal congestion  Recommend nasal saline as needed.              History of Present Illness     Alla is a 54 y.o. female with hx of cholecystectomy who presents for flulike symptoms x 4 days.  Missed work yesterday.  Wanted know what she had, never had COVID before,  had similar symptoms and she had him sleep in the other room but still got sick.  Has been doing home remedies such as lemon, tea, soups, hydrating with liquid IV, taking vitamins.  Slowly feeling better but still with bodyaches and fatigue.    History was conducted in Swedish without the use of .        Review of Systems   Constitutional:  Positive for chills and fever.   HENT:  Positive for congestion and postnasal drip. Negative for rhinorrhea, sinus pressure, sinus pain, sore throat and trouble swallowing.    Eyes:  Negative for pain, discharge, redness and itching.   Respiratory:  Positive for cough. Negative for shortness of breath and wheezing.    Cardiovascular:  Negative for chest pain and palpitations.   Gastrointestinal:  " Positive for diarrhea. Negative for abdominal pain.   Neurological:  Positive for headaches.       Objective   /69 (BP Location: Left arm, Patient Position: Sitting, Cuff Size: Standard)   Pulse 86   Temp 97.5 °F (36.4 °C) (Temporal)   Resp 16   Ht 4' 9\" (1.448 m)   Wt 49 kg (108 lb)   SpO2 98%   BMI 23.37 kg/m²      Physical Exam  Vitals reviewed.   Constitutional:       Appearance: Normal appearance.   HENT:      Head: Normocephalic and atraumatic.      Right Ear: Tympanic membrane, ear canal and external ear normal.      Left Ear: Tympanic membrane, ear canal and external ear normal.      Nose: Congestion and rhinorrhea present.      Mouth/Throat:      Mouth: Mucous membranes are moist.      Pharynx: Posterior oropharyngeal erythema present. No oropharyngeal exudate.   Eyes:      Extraocular Movements: Extraocular movements intact.      Conjunctiva/sclera: Conjunctivae normal.      Pupils: Pupils are equal, round, and reactive to light.   Cardiovascular:      Rate and Rhythm: Normal rate and regular rhythm.      Pulses: Normal pulses.      Heart sounds: Normal heart sounds.   Pulmonary:      Effort: Pulmonary effort is normal.      Breath sounds: Normal breath sounds.   Lymphadenopathy:      Cervical: No cervical adenopathy.   Neurological:      Mental Status: She is alert and oriented to person, place, and time. Mental status is at baseline.         "

## 2025-01-07 NOTE — LETTER
January 7, 2025     Patient: Alla Capone  YOB: 1970  Date of Visit: 1/7/2025      To Whom it May Concern:    Alla Capone is under my professional care. Alla was seen in my office on 1/7/2025. Alla may return to work on Thursday, January 9, 2025 pending fever free for 24 hours and COVID/flu testing .    If you have any questions or concerns, please don't hesitate to call.         Sincerely,          Porsha Bates PA-C        CC: No Recipients

## 2025-01-07 NOTE — LETTER
January 7, 2025     Patient: Alla Capone  YOB: 1970  Date of Visit: 1/7/2025      To Whom it May Concern:    Alla Capone is under my professional care. Alla was seen in my office on 1/7/2025. Alla may return to work on Thursday, January 9, 2025 pending fever free for 24 hours and COVID/flu testing. Please excuse her starting 1/6/2025.     If you have any questions or concerns, please don't hesitate to call.         Sincerely,        Porsha Bates PA-C        CC: No Recipients

## 2025-01-08 ENCOUNTER — RESULTS FOLLOW-UP (OUTPATIENT)
Dept: FAMILY MEDICINE CLINIC | Facility: CLINIC | Age: 55
End: 2025-01-08

## 2025-01-08 DIAGNOSIS — U07.1 COVID: Primary | ICD-10-CM

## 2025-01-08 LAB
FLUAV RNA RESP QL NAA+PROBE: NEGATIVE
FLUBV RNA RESP QL NAA+PROBE: NEGATIVE
SARS-COV-2 RNA RESP QL NAA+PROBE: POSITIVE

## 2025-01-08 RX ORDER — NIRMATRELVIR AND RITONAVIR 300-100 MG
3 KIT ORAL 2 TIMES DAILY
Qty: 30 TABLET | Refills: 0 | Status: SHIPPED | OUTPATIENT
Start: 2025-01-08 | End: 2025-01-13

## 2025-01-09 NOTE — TELEPHONE ENCOUNTER
Patient would let know when she should get a repeat covid test for work. She would also like a note to excuse her to return to work on Monday.

## 2025-01-30 ENCOUNTER — APPOINTMENT (OUTPATIENT)
Dept: LAB | Age: 55
End: 2025-01-30
Payer: COMMERCIAL

## 2025-01-30 DIAGNOSIS — Z00.00 ROUTINE GENERAL MEDICAL EXAMINATION AT A HEALTH CARE FACILITY: ICD-10-CM

## 2025-01-30 DIAGNOSIS — Z00.00 ANNUAL PHYSICAL EXAM: ICD-10-CM

## 2025-01-30 DIAGNOSIS — R53.83 FATIGUE, UNSPECIFIED TYPE: ICD-10-CM

## 2025-01-30 DIAGNOSIS — R53.83 OTHER FATIGUE: ICD-10-CM

## 2025-01-30 LAB
25(OH)D3 SERPL-MCNC: 35.2 NG/ML (ref 30–100)
ALBUMIN SERPL BCG-MCNC: 4.4 G/DL (ref 3.5–5)
ALP SERPL-CCNC: 77 U/L (ref 34–104)
ALT SERPL W P-5'-P-CCNC: 28 U/L (ref 7–52)
ANION GAP SERPL CALCULATED.3IONS-SCNC: 9 MMOL/L (ref 4–13)
AST SERPL W P-5'-P-CCNC: 22 U/L (ref 13–39)
BASOPHILS # BLD AUTO: 0.03 THOUSANDS/ΜL (ref 0–0.1)
BASOPHILS NFR BLD AUTO: 1 % (ref 0–1)
BILIRUB SERPL-MCNC: 0.59 MG/DL (ref 0.2–1)
BUN SERPL-MCNC: 22 MG/DL (ref 5–25)
CALCIUM SERPL-MCNC: 10.2 MG/DL (ref 8.4–10.2)
CHLORIDE SERPL-SCNC: 103 MMOL/L (ref 96–108)
CHOLEST SERPL-MCNC: 261 MG/DL (ref ?–200)
CO2 SERPL-SCNC: 28 MMOL/L (ref 21–32)
CREAT SERPL-MCNC: 0.59 MG/DL (ref 0.6–1.3)
EOSINOPHIL # BLD AUTO: 0.2 THOUSAND/ΜL (ref 0–0.61)
EOSINOPHIL NFR BLD AUTO: 5 % (ref 0–6)
ERYTHROCYTE [DISTWIDTH] IN BLOOD BY AUTOMATED COUNT: 14.8 % (ref 11.6–15.1)
GFR SERPL CREATININE-BSD FRML MDRD: 103 ML/MIN/1.73SQ M
GLUCOSE P FAST SERPL-MCNC: 81 MG/DL (ref 65–99)
HCT VFR BLD AUTO: 45.6 % (ref 34.8–46.1)
HDLC SERPL-MCNC: 97 MG/DL
HGB BLD-MCNC: 14.5 G/DL (ref 11.5–15.4)
IMM GRANULOCYTES # BLD AUTO: 0.01 THOUSAND/UL (ref 0–0.2)
IMM GRANULOCYTES NFR BLD AUTO: 0 % (ref 0–2)
LDLC SERPL CALC-MCNC: 153 MG/DL (ref 0–100)
LYMPHOCYTES # BLD AUTO: 1.98 THOUSANDS/ΜL (ref 0.6–4.47)
LYMPHOCYTES NFR BLD AUTO: 45 % (ref 14–44)
MAGNESIUM SERPL-MCNC: 2.2 MG/DL (ref 1.9–2.7)
MCH RBC QN AUTO: 29 PG (ref 26.8–34.3)
MCHC RBC AUTO-ENTMCNC: 31.8 G/DL (ref 31.4–37.4)
MCV RBC AUTO: 91 FL (ref 82–98)
MONOCYTES # BLD AUTO: 0.34 THOUSAND/ΜL (ref 0.17–1.22)
MONOCYTES NFR BLD AUTO: 8 % (ref 4–12)
NEUTROPHILS # BLD AUTO: 1.79 THOUSANDS/ΜL (ref 1.85–7.62)
NEUTS SEG NFR BLD AUTO: 41 % (ref 43–75)
NRBC BLD AUTO-RTO: 0 /100 WBCS
PLATELET # BLD AUTO: 195 THOUSANDS/UL (ref 149–390)
PMV BLD AUTO: 12.2 FL (ref 8.9–12.7)
POTASSIUM SERPL-SCNC: 4.3 MMOL/L (ref 3.5–5.3)
PROT SERPL-MCNC: 7.7 G/DL (ref 6.4–8.4)
RBC # BLD AUTO: 5 MILLION/UL (ref 3.81–5.12)
SODIUM SERPL-SCNC: 140 MMOL/L (ref 135–147)
TRIGL SERPL-MCNC: 53 MG/DL (ref ?–150)
WBC # BLD AUTO: 4.35 THOUSAND/UL (ref 4.31–10.16)

## 2025-01-30 PROCEDURE — 36415 COLL VENOUS BLD VENIPUNCTURE: CPT

## 2025-01-30 PROCEDURE — 83735 ASSAY OF MAGNESIUM: CPT

## 2025-01-30 PROCEDURE — 80053 COMPREHEN METABOLIC PANEL: CPT

## 2025-01-30 PROCEDURE — 82306 VITAMIN D 25 HYDROXY: CPT

## 2025-01-30 PROCEDURE — 80061 LIPID PANEL: CPT

## 2025-01-30 PROCEDURE — 85025 COMPLETE CBC W/AUTO DIFF WBC: CPT

## 2025-01-31 ENCOUNTER — RESULTS FOLLOW-UP (OUTPATIENT)
Dept: FAMILY MEDICINE CLINIC | Facility: CLINIC | Age: 55
End: 2025-01-31

## 2025-04-06 ENCOUNTER — APPOINTMENT (EMERGENCY)
Dept: CT IMAGING | Facility: HOSPITAL | Age: 55
End: 2025-04-06
Payer: COMMERCIAL

## 2025-04-06 ENCOUNTER — HOSPITAL ENCOUNTER (EMERGENCY)
Facility: HOSPITAL | Age: 55
Discharge: HOME | End: 2025-04-06
Attending: EMERGENCY MEDICINE
Payer: COMMERCIAL

## 2025-04-06 VITALS
WEIGHT: 106.7 LBS | OXYGEN SATURATION: 100 % | RESPIRATION RATE: 20 BRPM | BODY MASS INDEX: 23.09 KG/M2 | HEART RATE: 75 BPM | DIASTOLIC BLOOD PRESSURE: 75 MMHG | SYSTOLIC BLOOD PRESSURE: 110 MMHG | TEMPERATURE: 97.7 F

## 2025-04-06 DIAGNOSIS — S39.012A STRAIN OF LUMBAR REGION, INITIAL ENCOUNTER: Primary | ICD-10-CM

## 2025-04-06 PROCEDURE — 99284 EMERGENCY DEPT VISIT MOD MDM: CPT | Performed by: EMERGENCY MEDICINE

## 2025-04-06 PROCEDURE — 72131 CT LUMBAR SPINE W/O DYE: CPT

## 2025-04-06 PROCEDURE — 99284 EMERGENCY DEPT VISIT MOD MDM: CPT

## 2025-04-06 RX ORDER — LIDOCAINE 50 MG/G
1 PATCH TOPICAL DAILY
Qty: 14 PATCH | Refills: 0 | Status: SHIPPED | OUTPATIENT
Start: 2025-04-06

## 2025-04-06 RX ORDER — KETOROLAC TROMETHAMINE 30 MG/ML
15 INJECTION, SOLUTION INTRAMUSCULAR; INTRAVENOUS ONCE
Status: DISCONTINUED | OUTPATIENT
Start: 2025-04-06 | End: 2025-04-06 | Stop reason: HOSPADM

## 2025-04-06 RX ORDER — CYCLOBENZAPRINE HCL 10 MG
10 TABLET ORAL ONCE
Status: DISCONTINUED | OUTPATIENT
Start: 2025-04-06 | End: 2025-04-06 | Stop reason: HOSPADM

## 2025-04-06 RX ORDER — IBUPROFEN 600 MG/1
600 TABLET, FILM COATED ORAL EVERY 6 HOURS PRN
Qty: 30 TABLET | Refills: 0 | Status: SHIPPED | OUTPATIENT
Start: 2025-04-06

## 2025-04-06 NOTE — DISCHARGE INSTRUCTIONS
"\"Apparent enlargement of the right S1 nerve root. Further evaluation with a lumbar spine MRI study without and with intravenous gadolinium is advised.\"  Follow-up with your primary care physician to discuss possible MRI    Recommend Tylenol 500 mg and ibuprofen 600 mg every 6 hours as needed for pain  Apply topical lidocaine patch daily    Return for any worsening symptoms including worsening pain, numbness or tingling in the area where you wipe, bowel or bladder incontinence, or any other concerning symptoms    Patient Education     Low back pain - Discharge instructions   The Basics   Written by the doctors and editors at [facility]   What are discharge instructions? -- Discharge instructions are information about how to take care of yourself after getting medical care for a health problem.  What is low back pain? -- Low back pain is pain or discomfort in the lower part of your back. Many people have low back pain at some point, and it most often gets better on its own. Many different things can cause low back pain. Most of the time, doctors do not know the exact cause.  Back pain can happen if you strain a muscle. This is often what has happened when a person \"throws out\" their back. This refers to pain that starts suddenly after physical activity, like lifting something heavy or bending the back.  Back pain can also happen if you have (figure 1):   Damaged, bulging, or torn discs   Arthritis affecting the joints of the spine   Bony growths on the vertebrae that crowd nearby nerves   A \"compression fracture\" due to osteoporosis (a condition that makes your bones weak)   A vertebra out of place   Narrowing in the spinal canal   A tumor or infection (but this is very rare)  How do I care for myself at home? -- Ask the doctor or nurse what you should do when you go home. Make sure that you understand exactly what you need to do to care for yourself. Ask questions if there is anything you do not understand.  You should " "also:   Use heat on your back for short periods of time, if it helps with pain. Put a heating pad (on the low setting) on your back for 20 minutes at a time a few times each day. Never go to sleep with heat on your back.   Try to stay as active as you can without causing too much pain, if your doctor or nurse said that it is OK. If your pain is severe, you might need to rest for a day or 2. But it's important to get back to walking and moving as soon as possible. Try to keep doing your normal daily activities. Get up and move around gently during the day as you are able.   Slowly start to increase your activity level as you are able to. If something causes your pain to come back or get worse, stop and go back to doing easier activities that did not hurt.   Avoid sitting or standing in 1 position for a long time. You might want to sleep with a pillow under or between your knees if this eases your pain.   Take a medicine like ibuprofen (sample brand names: Advil, Motrin) or naproxen (brand name: Aleve) for pain, if needed. These are nonsteroidal antiinflammatory drugs (\"NSAIDs\"). They might work better than acetaminophen for low back pain.   Talk to your doctor or nurse before trying any of the following. These treatments might help you feel better for a little while:   Spinal manipulation - This is when a chiropractor, physical therapist, or other professional moves or \"adjusts\" the joints of your back.   Acupuncture - This is when someone who knows traditional Chinese medicine inserts tiny needles through your skin to block pain signals.   Massage therapy - The massage therapist manipulates your muscles and soft tissues to decrease muscle tension and increase relaxation.  What follow-up care do I need? -- Your doctor or nurse will tell you if you need to make a follow-up appointment. If so, make sure that you know when and where to go. The doctor might suggest that you see a physical therapist to learn exercises to " help with your back pain.  When should I call the doctor? -- Call for emergency help right away (in the [place] and [place], call 9-1-1) if:   You are unable to walk, or cannot control your bowels or bladder.   You develop a fever of 100.4°F (38°C) or higher, chills, or night sweats.  Call your doctor for advice if:   Your legs are numb, weak, or tingly.   Your pain is getting worse, even with medicines and rest.   You develop a new rash.  All topics are updated as new evidence becomes available and our peer review process is complete.  This topic retrieved from [employer] on: May 15, 2024.  Topic 613232 Version 1.0  Release: 32.4.3 - C32.134  © 2024 UpToDate, Inc. and/or its affiliates. All rights reserved.  figure 1: Anatomy of the back     This drawing shows the different parts of the back. Back pain can be caused by problems with the muscles, ligaments, discs, bones (vertebrae), or nerves.  Graphic 94176 Version 6.0  Consumer Information Use and Disclaimer   Disclaimer: This generalized information is a limited summary of diagnosis, treatment, and/or medication information. It is not meant to be comprehensive and should be used as a tool to help the user understand and/or assess potential diagnostic and treatment options. It does NOT include all information about conditions, treatments, medications, side effects, or risks that may apply to a specific patient. It is not intended to be medical advice or a substitute for the medical advice, diagnosis, or treatment of a health care provider based on the health care provider's examination and assessment of a patient's specific and unique circumstances. Patients must speak with a health care provider for complete information about their health, medical questions, and treatment options, including any risks or benefits regarding use of medications. This information does not endorse any treatments or medications as safe, effective, or approved for treating a specific patient.  UpToDate, Inc. and its affiliates disclaim any warranty or liability relating to this information or the use thereof.The use of this information is governed by the Terms of Use, available at https://www.wolterskluwer.com/en/know/clinical-effectiveness-terms. 2024© UpToDate, Inc. and its affiliates and/or licensors. All rights reserved.  Copyright   © 2024 UpToDate, Inc. and/or its affiliates. All rights reserved.

## 2025-04-06 NOTE — ED CARE HANDOFF
Emergency Department Sign Out Note        Sign out and transfer of care from Dr. White See Separate Emergency Department note.     The patient, Alla Capone, was evaluated by the previous provider for low back pain.    Workup Completed:  CT lumbar spine    ED Course / Workup Pending (followup):  Patient signed out to me pending CT results    CT scan negative for any acute findings per my interpretation.    Formal CT read showed apparent enlargement of the right S1 nerve root and radiology recommended further evaluation with lumbar spine MRI.  This can be performed as an outpatient.  Patient updated on these findings, MRI was ordered.  She was advised to follow-up with primary care physician in the next few days for repeat evaluation.  Prescription for NSAIDs and Lidoderm patch sent to the pharmacy.  She was given strict return precautions and was in agreement the plan.                                  ED Course as of 04/06/25 1526   Sun Apr 06, 2025   1526 No acute osseous abnormality per my interpretation of CT lumbar spine     Procedures  Medical Decision Making  Amount and/or Complexity of Data Reviewed  Radiology: ordered.    Risk  Prescription drug management.            Disposition  Final diagnoses:   Strain of lumbar region, initial encounter     Time reflects when diagnosis was documented in both MDM as applicable and the Disposition within this note       Time User Action Codes Description Comment    4/6/2025  2:43 PM Geri White Add [S39.012A] Strain of lumbar region, initial encounter           ED Disposition       None          Follow-up Information    None       Patient's Medications   Discharge Prescriptions    No medications on file     No discharge procedures on file.       ED Provider  Electronically Signed by     Yoan Ellison MD  04/06/25 [id number]

## 2025-04-06 NOTE — ED PROVIDER NOTES
Time reflects when diagnosis was documented in both MDM as applicable and the Disposition within this note       Time User Action Codes Description Comment    4/6/2025  2:43 PM Geri White Add [S39.012A] Strain of lumbar region, initial encounter           ED Disposition       None          Assessment & Plan       Medical Decision Making  53-year-old female with right low back pain.  Differential diagnose includes lumbar or sacral spine fracture, subluxation, nerve impingement, muscle sprain.  Will perform CT of the L-spine to further evaluate.    Amount and/or Complexity of Data Reviewed  Radiology: ordered.    Risk  Prescription drug management.             Medications   cyclobenzaprine (FLEXERIL) tablet 10 mg (has no administration in time range)   ketorolac (TORADOL) injection 15 mg (has no administration in time range)       ED Risk Strat Scores                            SBIRT 22yo+      Flowsheet Row Most Recent Value   Initial Alcohol Screen: US AUDIT-C     1. How often do you have a drink containing alcohol? 0 Filed at: 04/06/2025 1432   2. How many drinks containing alcohol do you have on a typical day you are drinking?  0 Filed at: 04/06/2025 1432   3a. Male UNDER 65: How often do you have five or more drinks on one occasion? 0 Filed at: 04/06/2025 1432   3b. FEMALE Any Age, or MALE 65+: How often do you have 4 or more drinks on one occassion? 0 Filed at: 04/06/2025 1432   Audit-C Score 0 Filed at: 04/06/2025 1432   DEMOND: How many times in the past year have you...    Used an illegal drug or used a prescription medication for non-medical reasons? Never Filed at: 04/06/2025 1432                            History of Present Illness       Chief Complaint   Patient presents with    Back Pain     Lower back pain x 2 days, spasms in her buttock that go down the right leg. Hx of sciatica. Took 2 Excedrin last night, pain only when moving.        Past Medical History:   Diagnosis Date    Anemia     Anxiety      Depression 02/17/2020    History of depression 02/17/2020    History of transfusion 2017    due to vag bleeding    Insomnia     Kidney stone     Mass of breast, left     Mass of breast, right     Seasonal allergies       Past Surgical History:   Procedure Laterality Date    COLONOSCOPY  12/2021    EGD N/A 2023    bx taken    HYSTERECTOMY      HYSTEROSCOPY      KIDNEY STONE SURGERY      LAPAROSCOPIC CHOLECYSTECTOMY      IA CORRJ HLX VLGS BNCTY SESMDC DSTL METAR OSTEOT Right 09/01/2020    Procedure: BUNIONECTOMY OLIVIA;  Surgeon: Richardson Stevens DPM;  Location:  MAIN OR;  Service: Podiatry    TUBAL LIGATION        Family History   Problem Relation Age of Onset    Hypertension Mother     Pancreatic cancer Mother     Liver cancer Mother     Heart disease Father     Breast cancer Maternal Aunt     Pancreatic cancer Maternal Uncle     Stomach cancer Paternal Uncle       Social History     Tobacco Use    Smoking status: Never    Smokeless tobacco: Never   Vaping Use    Vaping status: Never Used   Substance Use Topics    Alcohol use: Not Currently     Alcohol/week: 1.0 standard drink of alcohol     Types: 1 Glasses of wine per week     Comment: occasional    Drug use: Never      E-Cigarette/Vaping    E-Cigarette Use Never User       E-Cigarette/Vaping Substances    Nicotine No     THC No     CBD No     Flavoring No     Other No       I have reviewed and agree with the history as documented.     54-year-old female presenting to the emerged department with 2 to 3 days of low back pain radiating down the right leg.  Notes the pain is shooting down her right leg and when it happens she feels like her leg gives out.  Patient notes that she has had sciatica in the past and does not feel like that.  A week ago she stumbled while putting on her shoe and stumbled to 1 side but did not fall to the ground.  No nausea vomiting.  No dysuria hematuria.  No numbness or tingling of the legs.        Review of Systems   Constitutional:   Negative for chills and fever.   HENT:  Negative for ear pain and sore throat.    Eyes:  Negative for pain and visual disturbance.   Respiratory:  Negative for cough and shortness of breath.    Cardiovascular:  Negative for chest pain and palpitations.   Gastrointestinal:  Negative for abdominal pain and vomiting.   Genitourinary:  Negative for dysuria and hematuria.   Musculoskeletal:  Positive for back pain. Negative for arthralgias.   Skin:  Negative for color change and rash.   Neurological:  Negative for seizures and syncope.   All other systems reviewed and are negative.          Objective       ED Triage Vitals   Temperature Pulse Blood Pressure Respirations SpO2 Patient Position - Orthostatic VS   04/06/25 1435 04/06/25 1435 04/06/25 1435 04/06/25 1435 04/06/25 1435 04/06/25 1435   97.7 °F (36.5 °C) 75 110/75 20 100 % Sitting      Temp Source Heart Rate Source BP Location FiO2 (%) Pain Score    04/06/25 1435 04/06/25 1435 04/06/25 1435 -- 04/06/25 1432    Oral Monitor Left arm  No Pain      Vitals      Date and Time Temp Pulse SpO2 Resp BP Pain Score FACES Pain Rating User   04/06/25 1435 97.7 °F (36.5 °C) 75 100 % 20 110/75 -- -- HM   04/06/25 1432 -- -- -- -- -- No Pain -- SA            Physical Exam  Vitals and nursing note reviewed.   Constitutional:       General: She is not in acute distress.     Appearance: She is well-developed.   HENT:      Head: Normocephalic and atraumatic.   Eyes:      Conjunctiva/sclera: Conjunctivae normal.   Cardiovascular:      Rate and Rhythm: Normal rate and regular rhythm.      Heart sounds: No murmur heard.  Pulmonary:      Effort: Pulmonary effort is normal. No respiratory distress.      Breath sounds: Normal breath sounds.   Abdominal:      Palpations: Abdomen is soft.      Tenderness: There is no abdominal tenderness.   Musculoskeletal:         General: No swelling.      Cervical back: Neck supple.      Comments: Tenderness to palpation of the right L5-S1 area.  No  spasmodic muscles palpable.   Skin:     General: Skin is warm and dry.      Capillary Refill: Capillary refill takes less than 2 seconds.   Neurological:      Mental Status: She is alert.   Psychiatric:         Mood and Affect: Mood normal.         Results Reviewed       None            CT spine lumbar without contrast    (Results Pending)       Procedures    ED Medication and Procedure Management   Prior to Admission Medications   Prescriptions Last Dose Informant Patient Reported? Taking?   Cholecalciferol 10 MCG (400 UNIT) CAPS   Yes No   Sig: Take by mouth   ascorbic acid (VITAMIN C) 1000 MG tablet   Yes No   Sig: Take 1,000 mg by mouth daily   cyanocobalamin (VITAMIN B-12) 50 MCG tablet   Yes No   Sig: Take 50 mcg by mouth daily   famotidine (PEPCID) 40 MG tablet   No No   Sig: Take 1 tablet (40 mg total) by mouth 2 (two) times a day   Patient not taking: Reported on 1/7/2025   magnesium citrate oral solution   Yes No   Sig: Take 296 mL by mouth   multivitamin (THERAGRAN) TABS   Yes No   Sig: Take 1 tablet by mouth daily      Facility-Administered Medications: None     Patient's Medications   Discharge Prescriptions    No medications on file     No discharge procedures on file.  ED SEPSIS DOCUMENTATION   Time reflects when diagnosis was documented in both MDM as applicable and the Disposition within this note       Time User Action Codes Description Comment    4/6/2025  2:43 PM Geri White Add [S39.012A] Strain of lumbar region, initial encounter                  Geri White MD  04/06/25 [id number]

## 2025-08-06 ENCOUNTER — APPOINTMENT (EMERGENCY)
Dept: RADIOLOGY | Facility: HOSPITAL | Age: 55
End: 2025-08-06
Payer: COMMERCIAL

## 2025-08-06 ENCOUNTER — HOSPITAL ENCOUNTER (EMERGENCY)
Facility: HOSPITAL | Age: 55
Discharge: HOME | End: 2025-08-06
Attending: STUDENT IN AN ORGANIZED HEALTH CARE EDUCATION/TRAINING PROGRAM
Payer: COMMERCIAL

## 2025-08-06 ENCOUNTER — APPOINTMENT (EMERGENCY)
Dept: CT IMAGING | Facility: HOSPITAL | Age: 55
End: 2025-08-06
Payer: COMMERCIAL

## 2025-08-07 ENCOUNTER — TELEPHONE (OUTPATIENT)
Dept: FAMILY MEDICINE CLINIC | Facility: CLINIC | Age: 55
End: 2025-08-07

## 2025-08-08 ENCOUNTER — TELEPHONE (OUTPATIENT)
Dept: FAMILY MEDICINE CLINIC | Facility: CLINIC | Age: 55
End: 2025-08-08

## (undated) DEVICE — GAUZE SPONGES,16 PLY: Brand: CURITY

## (undated) DEVICE — SUT VICRYL 3-0 SH 27 IN J416H

## (undated) DEVICE — COBAN 4 IN STERILE

## (undated) DEVICE — CURITY NON-ADHERENT STRIPS: Brand: CURITY

## (undated) DEVICE — SUT PROLENE 4-0 PS-2 18 IN 8682H

## (undated) DEVICE — BETHLEHEM UNIVERSAL  MIONR EXT: Brand: CARDINAL HEALTH

## (undated) DEVICE — STRETCH BANDAGE: Brand: CURITY

## (undated) DEVICE — INTENDED FOR TISSUE SEPARATION, AND OTHER PROCEDURES THAT REQUIRE A SHARP SURGICAL BLADE TO PUNCTURE OR CUT.: Brand: BARD-PARKER SAFETY BLADES SIZE 15, STERILE

## (undated) DEVICE — NEEDLE BLUNT 18 G X 1 1/2IN

## (undated) DEVICE — SCD SEQUENTIAL COMPRESSION COMFORT SLEEVE MEDIUM KNEE LENGTH: Brand: KENDALL SCD

## (undated) DEVICE — CHLORAPREP HI-LITE 26ML ORANGE

## (undated) DEVICE — NEEDLE 25G X 1 1/2

## (undated) DEVICE — TUBING SUCTION 5MM X 12 FT

## (undated) DEVICE — INTENDED FOR TISSUE SEPARATION, AND OTHER PROCEDURES THAT REQUIRE A SHARP SURGICAL BLADE TO PUNCTURE OR CUT.: Brand: BARD-PARKER ® SAFETYLOCK CARBON RIB-BACK BLADES

## (undated) DEVICE — SYRINGE 10ML LL

## (undated) DEVICE — POV-IOD SOLUTION 4OZ BT

## (undated) DEVICE — CANNULATED SCREWDRIVER

## (undated) DEVICE — SINGLE PORT MANIFOLD: Brand: NEPTUNE 2

## (undated) DEVICE — PENCIL ELECTROSURG E-Z CLEAN -0035H

## (undated) DEVICE — K-WIRE
Type: IMPLANTABLE DEVICE | Site: FOOT | Status: NON-FUNCTIONAL
Brand: ASNIS
Removed: 2020-09-01

## (undated) DEVICE — STOCKINETTE 2P PREROLLD 6X60

## (undated) DEVICE — CANNULATED COUNTERSINK: Brand: ASNIS

## (undated) DEVICE — STERILE POLYISOPRENE POWDER-FREE SURGICAL GLOVES: Brand: PROTEXIS

## (undated) DEVICE — CUFF TOURNIQUET DISP SZ18

## (undated) DEVICE — SUT VICRYL 4-0 PS-2 27 IN J426H